# Patient Record
Sex: FEMALE | HISPANIC OR LATINO | Employment: FULL TIME | URBAN - METROPOLITAN AREA
[De-identification: names, ages, dates, MRNs, and addresses within clinical notes are randomized per-mention and may not be internally consistent; named-entity substitution may affect disease eponyms.]

---

## 2017-10-03 ENCOUNTER — OFFICE VISIT (OUTPATIENT)
Dept: FAMILY MEDICINE | Facility: CLINIC | Age: 37
End: 2017-10-03
Payer: COMMERCIAL

## 2017-10-03 VITALS
HEART RATE: 90 BPM | TEMPERATURE: 99 F | OXYGEN SATURATION: 100 % | BODY MASS INDEX: 24.38 KG/M2 | DIASTOLIC BLOOD PRESSURE: 70 MMHG | SYSTOLIC BLOOD PRESSURE: 106 MMHG | HEIGHT: 57 IN | WEIGHT: 113 LBS

## 2017-10-03 DIAGNOSIS — M46.46 LUMBAR DISCITIS: ICD-10-CM

## 2017-10-03 DIAGNOSIS — Z00.00 ROUTINE HISTORY AND PHYSICAL EXAMINATION OF ADULT: ICD-10-CM

## 2017-10-03 DIAGNOSIS — M54.50 CHRONIC BILATERAL LOW BACK PAIN WITHOUT SCIATICA: ICD-10-CM

## 2017-10-03 DIAGNOSIS — G89.29 CHRONIC BILATERAL LOW BACK PAIN WITHOUT SCIATICA: ICD-10-CM

## 2017-10-03 DIAGNOSIS — Z23 NEED FOR PROPHYLACTIC VACCINATION AND INOCULATION AGAINST INFLUENZA: ICD-10-CM

## 2017-10-03 DIAGNOSIS — J01.90 ACUTE SINUSITIS WITH SYMPTOMS > 10 DAYS: Primary | ICD-10-CM

## 2017-10-03 LAB
CHOLEST SERPL-MCNC: 178 MG/DL
GLUCOSE SERPL-MCNC: 73 MG/DL (ref 70–99)
HDLC SERPL-MCNC: 57 MG/DL
LDLC SERPL CALC-MCNC: 91 MG/DL
NONHDLC SERPL-MCNC: 121 MG/DL
TRIGL SERPL-MCNC: 148 MG/DL

## 2017-10-03 PROCEDURE — 90686 IIV4 VACC NO PRSV 0.5 ML IM: CPT | Performed by: PHYSICIAN ASSISTANT

## 2017-10-03 PROCEDURE — 99213 OFFICE O/P EST LOW 20 MIN: CPT | Mod: 25 | Performed by: PHYSICIAN ASSISTANT

## 2017-10-03 PROCEDURE — 36415 COLL VENOUS BLD VENIPUNCTURE: CPT | Performed by: PHYSICIAN ASSISTANT

## 2017-10-03 PROCEDURE — 90471 IMMUNIZATION ADMIN: CPT | Performed by: PHYSICIAN ASSISTANT

## 2017-10-03 PROCEDURE — 99395 PREV VISIT EST AGE 18-39: CPT | Mod: 25 | Performed by: PHYSICIAN ASSISTANT

## 2017-10-03 PROCEDURE — 82947 ASSAY GLUCOSE BLOOD QUANT: CPT | Performed by: PHYSICIAN ASSISTANT

## 2017-10-03 PROCEDURE — 80061 LIPID PANEL: CPT | Performed by: PHYSICIAN ASSISTANT

## 2017-10-03 RX ORDER — GUAIFENESIN AND DEXTROMETHORPHAN HYDROBROMIDE 1200; 60 MG/1; MG/1
1 TABLET, EXTENDED RELEASE ORAL 2 TIMES DAILY
Qty: 28 TABLET | Refills: 0 | Status: SHIPPED | OUTPATIENT
Start: 2017-10-03 | End: 2019-12-09

## 2017-10-03 RX ORDER — METHOCARBAMOL 750 MG/1
750 TABLET, FILM COATED ORAL 3 TIMES DAILY PRN
Qty: 30 TABLET | Refills: 1 | Status: SHIPPED | OUTPATIENT
Start: 2017-10-03 | End: 2019-12-09

## 2017-10-03 RX ORDER — NAPROXEN 500 MG/1
500 TABLET ORAL 2 TIMES DAILY WITH MEALS
Qty: 30 TABLET | Refills: 2 | Status: SHIPPED | OUTPATIENT
Start: 2017-10-03 | End: 2019-12-09

## 2017-10-03 ASSESSMENT — PAIN SCALES - GENERAL: PAINLEVEL: NO PAIN (0)
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
## 2017-10-03 NOTE — PATIENT INSTRUCTIONS
Augmentin 875 mg, 1 tablet twice a day for 10 days  Increase fluids  Nasal wash  Mucinex DM 1 tablet twice a day   Follow up if not better after the antibiotic course.     For back  Robaxin 750 mg three times a day as needed for back tightness  Naproxen 500 mg twice a day with food as needed for back pain  Apply warm pack  Start physical therapy    Preventive Health Recommendations  Female Ages 26 - 39  Yearly exam:   See your health care provider every year in order to    Review health changes.     Discuss preventive care.      Review your medicines if you your doctor has prescribed any.    Until age 30: Get a Pap test every three years (more often if you have had an abnormal result).    After age 30: Talk to your doctor about whether you should have a Pap test every 3 years or have a Pap test with HPV screening every 5 years.   You do not need a Pap test if your uterus was removed (hysterectomy) and you have not had cancer.  You should be tested each year for STDs (sexually transmitted diseases), if you're at risk.   Talk to your provider about how often to have your cholesterol checked.  If you are at risk for diabetes, you should have a diabetes test (fasting glucose).  Shots: Get a flu shot each year. Get a tetanus shot every 10 years.   Nutrition:     Eat at least 5 servings of fruits and vegetables each day.    Eat whole-grain bread, whole-wheat pasta and brown rice instead of white grains and rice.    Talk to your provider about Calcium and Vitamin D.     Lifestyle    Exercise at least 150 minutes a week (30 minutes a day, 5 days of the week). This will help you control your weight and prevent disease.    Limit alcohol to one drink per day.    No smoking.     Wear sunscreen to prevent skin cancer.    See your dentist every six months for an exam and cleaning.

## 2017-10-03 NOTE — MR AVS SNAPSHOT
After Visit Summary   10/3/2017    Stephanie Goodman    MRN: [id number]           Patient Information     Date Of Birth          1980        Visit Information        Provider Department      10/3/2017 9:00 AM Gale Jacob PA-C [facility]        Today's Diagnoses     Acute sinusitis with symptoms > 10 days    -  1    Need for prophylactic vaccination and inoculation against influenza        Routine history and physical examination of adult        Lumbar discitis        Chronic bilateral low back pain without sciatica          Care Instructions    Augmentin 875 mg, 1 tablet twice a day for 10 days  Increase fluids  Nasal wash  Mucinex DM 1 tablet twice a day   Follow up if not better after the antibiotic course.     For back  Robaxin 750 mg three times a day as needed for back tightness  Naproxen 500 mg twice a day with food as needed for back pain  Apply warm pack  Start physical therapy    Preventive Health Recommendations  Female Ages 26 - 39  Yearly exam:   See your health care provider every year in order to    Review health changes.     Discuss preventive care.      Review your medicines if you your doctor has prescribed any.    Until age 30: Get a Pap test every three years (more often if you have had an abnormal result).    After age 30: Talk to your doctor about whether you should have a Pap test every 3 years or have a Pap test with HPV screening every 5 years.   You do not need a Pap test if your uterus was removed (hysterectomy) and you have not had cancer.  You should be tested each year for STDs (sexually transmitted diseases), if you're at risk.   Talk to your provider about how often to have your cholesterol checked.  If you are at risk for diabetes, you should have a diabetes test (fasting glucose).  Shots: Get a flu shot each year. Get a tetanus shot every 10 years.   Nutrition:     Eat at least 5 servings of fruits and vegetables each  day.    Eat whole-grain bread, whole-wheat pasta and brown rice instead of white grains and rice.    Talk to your provider about Calcium and Vitamin D.     Lifestyle    Exercise at least 150 minutes a week (30 minutes a day, 5 days of the week). This will help you control your weight and prevent disease.    Limit alcohol to one drink per day.    No smoking.     Wear sunscreen to prevent skin cancer.    See your dentist every six months for an exam and cleaning.            Follow-ups after your visit        Additional Services     [facility] REFERRAL       **This order will print in the [facility] Scheduling Office**    Physical Therapy, Hand Therapy and Chiropractic Care are available through:    *[facility]  *[facility]  *[facility]    Call one number to schedule at any of the above locations: [phone].    Your provider has referred you to: Physical Therapy at [facility] or [facility]    Indication/Reason for Referral: lumbar disk disease  Onset of Illness: intermittent low back pain  Therapy Orders: improve flexibility and strength on lumbar back  Special Programs: massage  Special Request: Manual Therapy: Myofascial Release/Massage  None    Nelida    Nelida      Additional Comments for the Therapist or Chiropractor: massage    Please be aware that coverage of these services is subject to the terms and limitations of your health insurance plan.  Call member services at your health plan with any benefit or coverage questions.      Please bring the following to your appointment:    *Your personal calendar for scheduling future appointments  *Comfortable clothing                  Who to contact     If you have questions or need follow up information about today's clinic visit or your schedule please contact [facility] directly at [phone].  Normal or non-critical lab and imaging results will be communicated to you by [facility], letter  "or phone within 4 business days after the clinic has received the results. If you do not hear from us within 7 days, please contact the clinic through [employer] or phone. If you have a critical or abnormal lab result, we will notify you by phone as soon as possible.  Submit refill requests through [employer] or call your pharmacy and they will forward the refill request to us. Please allow 3 business days for your refill to be completed.          Additional Information About Your Visit        [employer] Information     [employer] lets you send messages to your doctor, view your test results, renew your prescriptions, schedule appointments and more. To sign up, go to [email or web address] . Click on \"Log in\" on the left side of the screen, which will take you to the Welcome page. Then click on \"Sign up Now\" on the right side of the page.     You will be asked to enter the access code listed below, as well as some personal information. Please follow the directions to create your username and password.     Your access code is: [email or web address]  Expires: 2018  9:40 AM     Your access code will  in 90 days. If you need help or a new code, please call your [facility] clinic or [phone].        Care EveryWhere ID     This is your Care EveryWhere ID. This could be used by other organizations to access your [facility] medical records  [id number]        Your Vitals Were     Pulse Temperature Height Pulse Oximetry Breastfeeding? BMI (Body Mass Index)    90 99  F (37.2  C) (Oral) 4' 8.5\" (1.435 m) 100% No 24.89 kg/m2       Blood Pressure from Last 3 Encounters:   10/03/17 106/70    Weight from Last 3 Encounters:   10/03/17 113 lb (51.3 kg)              We Performed the Following     Glucose     [facility] REFERRAL     Lipid Profile (Chol, Trig, HDL, LDL calc)          Today's Medication Changes          These changes are accurate as of: 10/3/17  9:40 AM.  If you have any questions, ask your nurse or doctor. "               Start taking these medicines.        Dose/Directions    amoxicillin-clavulanate 875-125 MG per tablet   Commonly known as:  AUGMENTIN   Used for:  Acute sinusitis with symptoms > 10 days   Started by:  Gale Jacob PA-C        Dose:  1 tablet   Take 1 tablet by mouth 2 times daily for 10 days   Quantity:  20 tablet   Refills:  0       Dextromethorphan-Guaifenesin  MG Tb12   Used for:  Acute sinusitis with symptoms > 10 days   Started by:  Gale Jacob PA-C        Dose:  1 tablet   Take 1 tablet by mouth 2 times daily   Quantity:  28 tablet   Refills:  0       methocarbamol 750 MG tablet   Commonly known as:  ROBAXIN   Used for:  Lumbar discitis   Started by:  Gale Jacob PA-C        Dose:  750 mg   Take 1 tablet (750 mg) by mouth 3 times daily as needed for muscle spasms   Quantity:  30 tablet   Refills:  1       naproxen 500 MG tablet   Commonly known as:  NAPROSYN   Used for:  Lumbar discitis   Started by:  Gale Jacob PA-C        Dose:  500 mg   Take 1 tablet (500 mg) by mouth 2 times daily (with meals)   Quantity:  30 tablet   Refills:  2            Where to get your medicines      These medications were sent to [facility]/pharmacy #[id number] - [facility], [address]  [address] [place] [address], [facility] [address]     Phone:  [phone]     amoxicillin-clavulanate 875-125 MG per tablet    Dextromethorphan-Guaifenesin  MG Tb12    methocarbamol 750 MG tablet    naproxen 500 MG tablet                Primary Care Provider    None Specified       No primary provider on file.        Equal Access to Services     [facility] Center[facility]UCINDA : Hadgill Machado, waalexda jigar, qaybta kaalzenobia jacobson, kaleigh gabriel. So [facility] [phone].    ATENCIÓN: Si habla español, tiene a oquendo disposición servicios gratuitos de asistencia lingüística. Llame al [phone].    We comply with  applicable federal civil rights laws and [place] laws. We do not discriminate on the basis of race, color, national origin, age, disability, sex, sexual orientation, or gender identity.            Thank you!     Thank you for choosing [facility]  for your care. Our goal is always to provide you with excellent care. Hearing back from our patients is one way we can continue to improve our services. Please take a few minutes to complete the written survey that you may receive in the mail after your visit with us. Thank you!             Your Updated Medication List - Protect others around you: Learn how to safely use, store and throw away your medicines at www.disposemymeds.org.          This list is accurate as of: 10/3/17  9:40 AM.  Always use your most recent med list.                   Brand Name Dispense Instructions for use Diagnosis    amoxicillin-clavulanate 875-125 MG per tablet    AUGMENTIN    20 tablet    Take 1 tablet by mouth 2 times daily for 10 days    Acute sinusitis with symptoms > 10 days       Dextromethorphan-Guaifenesin  MG Tb12     28 tablet    Take 1 tablet by mouth 2 times daily    Acute sinusitis with symptoms > 10 days       methocarbamol 750 MG tablet    ROBAXIN    30 tablet    Take 1 tablet (750 mg) by mouth 3 times daily as needed for muscle spasms    Lumbar discitis       naproxen 500 MG tablet    NAPROSYN    30 tablet    Take 1 tablet (500 mg) by mouth 2 times daily (with meals)    Lumbar discitis

## 2017-10-03 NOTE — Clinical Note
Please abstract the following data from this visit with this patient into the appropriate field in Epic:  Pap smear done on this date: 2 years ago (approximately), by this group: [facility] in [place], results were normal. .

## 2017-10-03 NOTE — PROGRESS NOTES
Injectable Influenza Immunization Documentation    1.  Is the person to be vaccinated sick today?   No    2. Does the person to be vaccinated have an allergy to a component   of the vaccine?   No    3. Has the person to be vaccinated ever had a serious reaction   to influenza vaccine in the past?   No    4. Has the person to be vaccinated ever had Guillain-Barré syndrome?   No    Form completed by Digna MEYERS

## 2017-10-03 NOTE — NURSING NOTE
"Chief Complaint   Patient presents with     Physical       Initial /70  Pulse 90  Temp 99  F (37.2  C) (Oral)  Ht 4' 8.5\" (1.435 m)  Wt 113 lb (51.3 kg)  SpO2 100%  Breastfeeding? No  BMI 24.89 kg/m2 Estimated body mass index is 24.89 kg/(m^2) as calculated from the following:    Height as of this encounter: 4' 8.5\" (1.435 m).    Weight as of this encounter: 113 lb (51.3 kg).  Medication Reconciliation: complete   Digna MEYERS        "

## 2017-10-03 NOTE — LETTER
October 9, 2017      Stephanie Goodman  [address]  [place] [address]            Dear Stephanie Goodman        Enclosed is a copy of the results.  Labs area within normal limits.     If you have any questions or concerns, please contact our team at [phone].      Sincerely,    Elena Jacob PAC/ak          Results for orders placed or performed in visit on 10/03/17   Lipid Profile (Chol, Trig, HDL, LDL calc)   Result Value Ref Range    Cholesterol 178 <200 mg/dL    Triglycerides 148 <150 mg/dL    HDL Cholesterol 57 >49 mg/dL    LDL Cholesterol Calculated 91 <100 mg/dL    Non HDL Cholesterol 121 <130 mg/dL   Glucose   Result Value Ref Range    Glucose 73 70 - 99 mg/dL

## 2017-10-09 ENCOUNTER — THERAPY VISIT (OUTPATIENT)
Dept: PHYSICAL THERAPY | Facility: CLINIC | Age: 37
End: 2017-10-09
Payer: COMMERCIAL

## 2017-10-09 DIAGNOSIS — M54.50 CHRONIC BILATERAL LOW BACK PAIN WITHOUT SCIATICA: ICD-10-CM

## 2017-10-09 DIAGNOSIS — G89.29 CHRONIC BILATERAL LOW BACK PAIN WITHOUT SCIATICA: ICD-10-CM

## 2017-10-09 DIAGNOSIS — M46.46 LUMBAR DISCITIS: Primary | ICD-10-CM

## 2017-10-09 PROCEDURE — 97110 THERAPEUTIC EXERCISES: CPT | Mod: GP | Performed by: PHYSICAL THERAPIST

## 2017-10-09 PROCEDURE — 97112 NEUROMUSCULAR REEDUCATION: CPT | Mod: GP | Performed by: PHYSICAL THERAPIST

## 2017-10-09 PROCEDURE — 97161 PT EVAL LOW COMPLEX 20 MIN: CPT | Mod: GP | Performed by: PHYSICAL THERAPIST

## 2017-10-09 NOTE — MR AVS SNAPSHOT
"              After Visit Summary   10/9/2017    Stephanie Goodman    MRN: [id number]           Patient Information     Date Of Birth          1980        Visit Information        Provider Department      10/9/2017 10:10 AM Christopher Oliver PT [facility]        Today's Diagnoses     Lumbar discitis    -  1    Chronic bilateral low back pain without sciatica           Follow-ups after your visit        Your next 10 appointments already scheduled     Oct 16, 2017 10:10 AM CDT   [facility] Sp[facility] with Christopher Oliver PT   [facility] ([facility]  )    [address]  [facility] [id number] [phone]              Who to contact     If you have questions or need follow up information about today's clinic visit or your schedule please contact [facility] directly at [phone].  Normal or non-critical lab and imaging results will be communicated to you by [email or web address], letter or phone within 4 business days after the clinic has received the results. If you do not hear from us within 7 days, please contact the clinic through [email or web address] or phone. If you have a critical or abnormal lab result, we will notify you by phone as soon as possible.  Submit refill requests through [employer] or call your pharmacy and they will forward the refill request to us. Please allow 3 business days for your refill to be completed.          Additional Information About Your Visit        [email or web address] Information     [employer] lets you send messages to your doctor, view your test results, renew your prescriptions, schedule appointments and more. To sign up, go to [email or web address] . Click on \"Log in\" on the left side of the screen, which will take you to the Welcome page. Then click on \"Sign up Now\" on the right side of the page.     You will be asked to enter the access code listed below, as well as some personal information. Please " follow the directions to create your username and password.     Your access code is: [email or web address]  Expires: 2018  9:40 AM     Your access code will  in 90 days. If you need help or a new code, please call your [place] clinic or [phone].        Care EveryWhere ID     This is your Care EveryWhere ID. This could be used by other organizations to access your [place] medical records  [id number]         Blood Pressure from Last 3 Encounters:   10/03/17 106/70    Weight from Last 3 Encounters:   10/03/17 51.3 kg (113 lb)              We Performed the Following     [facility] Inital Eval Report     Neuromuscular Re-Education     PT Eval, Low Complexity (30336)     Therapeutic Exercises        Primary Care Provider    None Specified       No primary provider on file.        Equal Access to Services     WALDO GILES : Hadgill lynch Sosergo, waaxda luqadaha, qaybta kaalmada adeegyada, kaleigh poole . So [facility] [phone].    ATENCIÓN: Si habla español, tiene a oquendo disposición servicios gratuitos de asistencia lingüística. Llame al [phone].    We comply with applicable federal civil rights laws and [place] laws. We do not discriminate on the basis of race, color, national origin, age, disability, sex, sexual orientation, or gender identity.            Thank you!     Thank you for choosing [facility]  for your care. Our goal is always to provide you with excellent care. Hearing back from our patients is one way we can continue to improve our services. Please take a few minutes to complete the written survey that you may receive in the mail after your visit with us. Thank you!             Your Updated Medication List - Protect others around you: Learn how to safely use, store and throw away your medicines at www.disposemymeds.org.          This list is accurate as of: 10/9/17 11:07 PM.  Always use your most recent med list.                    Brand Name Dispense Instructions for use Diagnosis    amoxicillin-clavulanate 875-125 MG per tablet    AUGMENTIN    20 tablet    Take 1 tablet by mouth 2 times daily for 10 days    Acute sinusitis with symptoms > 10 days       Dextromethorphan-Guaifenesin  MG Tb12     28 tablet    Take 1 tablet by mouth 2 times daily    Acute sinusitis with symptoms > 10 days       methocarbamol 750 MG tablet    ROBAXIN    30 tablet    Take 1 tablet (750 mg) by mouth 3 times daily as needed for muscle spasms    Lumbar discitis       naproxen 500 MG tablet    NAPROSYN    30 tablet    Take 1 tablet (500 mg) by mouth 2 times daily (with meals)    Lumbar discitis

## 2017-10-09 NOTE — PROGRESS NOTES
[facility] Initial Evaluation      Subjective:    Patient is a 37 year old female presenting with rehab back hpi.   Stephanie Goodman is a 37 year old female with a lumbar condition.  Occurance: MOPPING.  Condition occurred: at work.  This is a chronic and recurrent condition  10 YEAR HISTORY.   PA: 10/3/2017.    Patient reports pain:  Lumbar spine right, lumbar spine left and central lumbar spine.  Radiates to:  Gluteals right and gluteals left.  Pain is described as burning and aching and is intermittent and reported as 5/10.  Associated symptoms:  Loss of motion/stiffness and fatigue. Pain is the same all the time.  Symptoms are exacerbated by bending, lifting, sitting and standing (WORKING. RISING ROM A CHAIR.) and relieved by rest and NSAID's.  Since onset symptoms are unchanged.  Special tests:  MRI (IN THE PAST. ).  Previous treatment includes physical therapy (IN THE PAST. ).  There was significant improvement following previous treatment.  General health as reported by patient is good.  Past medical history: Dizziness, Concussions.  Medical allergies: no.  Other surgeries include:  Other.  Current medications:  Muscle relaxants and pain medication.  Current occupation is MAINT. .  Patient is working in normal job with restrictions.  Primary job tasks include:  Prolonged standing, lifting and repetitive tasks.    Barriers include:  None as reported by the patient.    Red flags:  None as reported by the patient.                        Objective:    System    Physical Exam      Tasha Lumbar Evaluation    Posture:  Sitting: fair        Correction of Posture: better  Other Observations: CENTRALIZES.   Movement Loss:  Flexion (Flex): pain and min  Extension (EXT): pain and mod  Side [place] R (SG R): pain  Side Glide L (SG L): pain  Test Movements:  FIS: During: increases      EIS: During: centralizing              Static Tests:          Lying Prone in Extension: CENTRALIZING.       Conclusion: derangement  Principle of Treatment:  Posture Correction: IN SITTING WITH TOWEL ROLL.     Extension: EIS, ELAINA.    Other: NEUTRAL SPINE, THER EX, THER ACT, NMR, MANUAL THERAPY.                                        ROS    Assessment/Plan:      Patient is a 37 year old female with lumbar complaints.    Patient has the following significant findings with corresponding treatment plan.                Diagnosis 1:  LUMBAR DISCITIS, CHRONIC BILATERAL LOW BACK PAIN WITHOUT SCIATICA.   Pain -  hot/cold therapy, US, electric stimulation, manual therapy, splint/taping/bracing/orthotics, self management, education, directional preference exercise and home program  Decreased ROM/flexibility - manual therapy, therapeutic exercise, therapeutic activity and home program  Decreased function - therapeutic activities and home program  Impaired posture - neuro re-education, therapeutic activities and home program    Therapy Evaluation Codes:   1) History comprised of:   Personal factors that impact the plan of care:      Past/current experiences.    Comorbidity factors that impact the plan of care are:      Concussion and Dizziness.     Medications impacting care: None.  2) Examination of Body Systems comprised of:   Body structures and functions that impact the plan of care:      Lumbar spine.   Activity limitations that impact the plan of care are:      Bending, Lifting, Sitting and Working.  3) Clinical presentation characteristics are:   Stable/Uncomplicated.  4) Decision-Making    Low complexity using standardized patient assessment instrument and/or measureable assessment of functional outcome.  Cumulative Therapy Evaluation is: Low complexity.    Previous and current functional limitations:  (See Goal Flow Sheet for this information)    Short term and Long term goals: (See Goal Flow Sheet for this information)     Communication ability:  Patient appears to be able to clearly communicate and understand verbal and  written communication and follow directions correctly.  Treatment Explanation - The following has been discussed with the patient:   RX ordered/plan of care  Anticipated outcomes  Possible risks and side effects  This patient would benefit from PT intervention to resume normal activities.   Rehab potential is good.    Frequency:  1 X week, once daily  Duration:  for 6 weeks  Discharge Plan:  Achieve all LTG.  Independent in home treatment program.  Reach maximal therapeutic benefit.    Please refer to the daily flowsheet for treatment today, total treatment time and time spent performing 1:1 timed codes.

## 2018-06-30 ENCOUNTER — OFFICE VISIT (OUTPATIENT)
Dept: URGENT CARE | Facility: URGENT CARE | Age: 38
End: 2018-06-30
Payer: COMMERCIAL

## 2018-06-30 VITALS
RESPIRATION RATE: 20 BRPM | BODY MASS INDEX: 25.11 KG/M2 | DIASTOLIC BLOOD PRESSURE: 64 MMHG | TEMPERATURE: 98 F | SYSTOLIC BLOOD PRESSURE: 132 MMHG | WEIGHT: 114 LBS | OXYGEN SATURATION: 100 % | HEART RATE: 66 BPM

## 2018-06-30 DIAGNOSIS — R39.15 URGENCY OF URINATION: ICD-10-CM

## 2018-06-30 DIAGNOSIS — R10.9 FLANK PAIN: Primary | ICD-10-CM

## 2018-06-30 DIAGNOSIS — N20.0 CALCULUS OF KIDNEY: ICD-10-CM

## 2018-06-30 DIAGNOSIS — R11.0 NAUSEA: ICD-10-CM

## 2018-06-30 LAB
ALBUMIN UR-MCNC: ABNORMAL MG/DL
APPEARANCE UR: CLEAR
BACTERIA #/AREA URNS HPF: ABNORMAL /HPF
BETA HCG QUAL IFA URINE: NEGATIVE
BILIRUB UR QL STRIP: NEGATIVE
COLOR UR AUTO: YELLOW
GLUCOSE UR STRIP-MCNC: NEGATIVE MG/DL
HGB UR QL STRIP: ABNORMAL
KETONES UR STRIP-MCNC: NEGATIVE MG/DL
LEUKOCYTE ESTERASE UR QL STRIP: NEGATIVE
MUCOUS THREADS #/AREA URNS LPF: PRESENT /LPF
NITRATE UR QL: NEGATIVE
NON-SQ EPI CELLS #/AREA URNS LPF: ABNORMAL /LPF
PH UR STRIP: 6 PH (ref 5–7)
RBC #/AREA URNS AUTO: ABNORMAL /HPF
SOURCE: ABNORMAL
SP GR UR STRIP: 1.02 (ref 1–1.03)
UROBILINOGEN UR STRIP-ACNC: 0.2 EU/DL (ref 0.2–1)
WBC #/AREA URNS AUTO: ABNORMAL /HPF

## 2018-06-30 PROCEDURE — 81001 URINALYSIS AUTO W/SCOPE: CPT | Performed by: PHYSICIAN ASSISTANT

## 2018-06-30 PROCEDURE — 84703 CHORIONIC GONADOTROPIN ASSAY: CPT | Performed by: PHYSICIAN ASSISTANT

## 2018-06-30 PROCEDURE — 96372 THER/PROPH/DIAG INJ SC/IM: CPT | Performed by: PHYSICIAN ASSISTANT

## 2018-06-30 PROCEDURE — 99214 OFFICE O/P EST MOD 30 MIN: CPT | Mod: 25 | Performed by: PHYSICIAN ASSISTANT

## 2018-06-30 RX ORDER — KETOROLAC TROMETHAMINE 30 MG/ML
60 INJECTION, SOLUTION INTRAMUSCULAR; INTRAVENOUS ONCE
Qty: 2 ML | Refills: 0
Start: 2018-06-30 | End: 2018-06-30

## 2018-06-30 RX ORDER — ONDANSETRON 4 MG/1
4 TABLET, ORALLY DISINTEGRATING ORAL ONCE
Qty: 1 TABLET | Refills: 0
Start: 2018-06-30 | End: 2018-06-30

## 2018-06-30 ASSESSMENT — ENCOUNTER SYMPTOMS
HEMATURIA: 0
NECK PAIN: 0
CHILLS: 0
SORE THROAT: 0
DYSURIA: 1
PALPITATIONS: 0
MYALGIAS: 0
LIGHT-HEADEDNESS: 0
COUGH: 0
DIZZINESS: 0
ABDOMINAL PAIN: 1
FREQUENCY: 1
NAUSEA: 0
RHINORRHEA: 0
NECK STIFFNESS: 0
FATIGUE: 0
FLANK PAIN: 0
VOMITING: 0
WEAKNESS: 0
NEUROLOGICAL NEGATIVE: 1
CARDIOVASCULAR NEGATIVE: 1
POLYDIPSIA: 0
RESPIRATORY NEGATIVE: 1
FEVER: 0
ACTIVITY CHANGE: 0
ENDOCRINE NEGATIVE: 1
CONSTITUTIONAL NEGATIVE: 1
ADENOPATHY: 0
SHORTNESS OF BREATH: 0
DIARRHEA: 0
HEADACHES: 0
MUSCULOSKELETAL NEGATIVE: 1

## 2018-06-30 ASSESSMENT — PAIN SCALES - GENERAL: PAINLEVEL: EXTREME PAIN (8)

## 2018-06-30 NOTE — PROGRESS NOTES
Chief Complaint:    Chief Complaint   Patient presents with     Abdominal Pain     started at 5 am       HPI: Stephanie Goodman is an 38 year old female who presents for evaluation and treatment of abdominal pain.  Patient states that this started this morning at roughly 5 A.M.  She noticed some urinary frequency and urgency when the pain started.  The pain has worsened and is in the L flank area.  She denies any diarrhea.  No nausea or vomiting.  She has been having normal bowel movements.  Last BM was this morning      ROS:      Review of Systems   Constitutional: Negative.  Negative for activity change, chills, fatigue and fever.   HENT: Negative for congestion, ear pain, rhinorrhea and sore throat.    Respiratory: Negative.  Negative for cough and shortness of breath.    Cardiovascular: Negative.  Negative for chest pain and palpitations.   Gastrointestinal: Positive for abdominal pain. Negative for diarrhea, nausea and vomiting.   Endocrine: Negative.  Negative for polydipsia and polyuria.   Genitourinary: Positive for dysuria, frequency and urgency. Negative for flank pain, hematuria, pelvic pain, vaginal discharge and vaginal pain.   Musculoskeletal: Negative.  Negative for myalgias, neck pain and neck stiffness.   Allergic/Immunologic: Negative for immunocompromised state.   Neurological: Negative.  Negative for dizziness, weakness, light-headedness and headaches.   Hematological: Negative for adenopathy.        No pertinent family Hx at this time.  No Pertinent surgical Hx at this time.  Patient does have a Hx of chronic low back pain.    Family History   No family history on file.    Social History  Social History     Social History     Marital status:      Spouse name: N/A     Number of children: N/A     Years of education: N/A     Occupational History     Not on file.     Social History Main Topics     Smoking status: Never Smoker     Smokeless tobacco: Never Used     Alcohol use Yes     Drug use:  No     Sexual activity: Yes     Partners: Male     Other Topics Concern     Not on file     Social History Narrative        Surgical History:  No past surgical history on file.     Problem List:  Patient Active Problem List   Diagnosis     Lumbar discitis     Chronic bilateral low back pain without sciatica        Allergies:  Allergies   Allergen Reactions     No Clinical Screening - See Comments         Current Meds:    Current Outpatient Prescriptions:      Dextromethorphan-Guaifenesin  MG TB12, Take 1 tablet by mouth 2 times daily (Patient not taking: Reported on 6/30/2018), Disp: 28 tablet, Rfl: 0     ketorolac (TORADOL) 60 MG/2ML SOLN injection, Inject 2 mLs (60 mg) into the muscle once for 1 dose, Disp: 2 mL, Rfl: 0     methocarbamol (ROBAXIN) 750 MG tablet, Take 1 tablet (750 mg) by mouth 3 times daily as needed for muscle spasms (Patient not taking: Reported on 6/30/2018), Disp: 30 tablet, Rfl: 1     naproxen (NAPROSYN) 500 MG tablet, Take 1 tablet (500 mg) by mouth 2 times daily (with meals) (Patient not taking: Reported on 6/30/2018), Disp: 30 tablet, Rfl: 2     ondansetron (ZOFRAN ODT) 4 MG ODT tab, Take 1 tablet (4 mg) by mouth once for 1 dose, Disp: 1 tablet, Rfl: 0     PHYSICAL EXAM:     Vital signs noted and reviewed by Carlos Brand  /64 (BP Location: Left arm, Patient Position: Chair, Cuff Size: Adult Regular)  Pulse 66  Temp 98  F (36.7  C) (Oral)  Resp 20  Wt 114 lb (51.7 kg)  SpO2 100%  BMI 25.11 kg/m2     PEFR:  General appearance: healthy, alert and severe distress  Ears: R TM - normal: no effusions, no erythema, and normal landmarks, L TM - normal: no effusions, no erythema, and normal landmarks  Eyes: R normal, L normal  Nose: normal  Oropharynx: normal  Neck: supple and no adenopathy  Lungs: normal and clear to auscultation. L CVA tenderness with palpation  Heart: S1, S2 normal, no murmur, click, rub or gallop, regular rate and rhythm  Abdomen: Abdomen soft, non-tender  without masses or organomegaly.  Extremities: Extremities normal. No deformities, edema, or skin discoloration.  Neuro: Gait normal. Reflexes normal and symmetric. Sensation grossly intact.     Labs:     Results for orders placed or performed in visit on 06/30/18   *UA reflex to Microscopic and Culture ([place] and [facility] (except [place] and [place])   Result Value Ref Range    Color Urine Yellow     Appearance Urine Clear     Glucose Urine Negative NEG^Negative mg/dL    Bilirubin Urine Negative NEG^Negative    Ketones Urine Negative NEG^Negative mg/dL    Specific Gravity Urine 1.025 1.003 - 1.035    Blood Urine Large (A) NEG^Negative    pH Urine 6.0 5.0 - 7.0 pH    Protein Albumin Urine Trace (A) NEG^Negative mg/dL    Urobilinogen Urine 0.2 0.2 - 1.0 EU/dL    Nitrite Urine Negative NEG^Negative    Leukocyte Esterase Urine Negative NEG^Negative    Source Midstream Urine    Beta HCG Qual, Urine - [facility] and [place] ([id number])   Result Value Ref Range    Beta HCG Qual IFA Urine Negative NEG^Negative      Urine Microscopic   Result Value Ref Range    WBC Urine 0 - 5 OTO5^0 - 5 /HPF    RBC Urine  (A) OTO2^O - 2 /HPF    Squamous Epithelial /LPF Urine Many (A) FEW^Few /LPF    Bacteria Urine Few (A) NEG^Negative /HPF    Mucous Urine Present (A) NEG^Negative /LPF         Medical Decision Making:    Differential Diagnosis:  Abdominal Pain: Diverticular Disease, UTI, Pyelonephritis, Ovarian Cyst and Kidney Stone  UTI: Dysuria, STD      ASSESSMENT:     1. Flank pain    2. Calculus of kidney    3. Urgency of urination    4. Nausea         PLAN:     Patient presents with worsening L flank pain with urinary frequency and urgency.    Urine HCG was negative.  Discussed urine results with patient.  There is some blood in the urine,   Highly suspect stone at this time.  Patient was given 60 Mg Toradol IM in clinic today and observed for 15 minutes.  She verbalized significant relief of symptoms.  She was given 4 Mg  Zofran ODT in clinic for nausea.  Patient does not have a Hx of stones.  Patient advised that I would like her evaluated further in the ED now.   Patient did drive herself here.  She was encouraged to call her  to come give her a ride as it is not safe for her to drive herself.  Patient refused EMS transport.  Patient's  will drive her.  She was discharged to his care in good condition at 11:19  Patient given copy of labs and AVS to take with today.  Patient verbalized understanding and agreed with this plan.     Carlos Brand  6/30/2018, 9:55 AM

## 2018-06-30 NOTE — MR AVS SNAPSHOT
After Visit Summary   6/30/2018    Stephanie Goodman    MRN: [id number]           Patient Information     Date Of Birth          1980        Visit Information        Provider Department      6/30/2018 9:50 AM Carlos Brand PA-C [facility]        Today's Diagnoses     Flank pain    -  1    Calculus of kidney        Urgency of urination        Nausea          Care Instructions      Flank Pain, Uncertain Cause  The flank is the area between your upper abdomen and your back. Pain there is often caused by a problem with your kidneys. It might be a kidney infection or a kidney stone. Other causes of flank pain include spinal arthritis, a pinched nerve from a back injury, or a back muscle strain or spasm.  The cause of your flank pain is not certain. You may need other tests.  Home care  Follow these tips when caring for yourself at home:    You may use acetaminophen or ibuprofen to control pain, unless your health care provider prescribed another medicine. If you have chronic liver or kidney disease, talk with your provider before taking these medicines. Also talk with your provider first if you ve ever had a stomach ulcer or GI bleeding.    If the pain is coming from your muscles, you may get relief with ice or heat. During the first 2 days after the injury, put an ice pack on the painful area for 20 minutes every 2 to 4 hours. This will reduce swelling and pain. A hot shower, hot bath, or heating pad works well for a muscle spasm. You can start with ice, then switch to heat after 2 days. You might find that alternating ice and heat works well. Use the method that feels the best to you.  Follow-up care  Follow up with your healthcare provider if your symptoms don t get better over the next few days.  When to seek medical advice  Call your healthcare provider right away if any of these happen:    Repeated vomiting    Fever of 100.4 F (38 C) or higher, or as directed by your  "health care provider    Flank pain that gets worse    Pain that spreads to the front of your belly (abdomen)    Dizziness, weakness, or fainting    Blood in your urine    Burning feeling when you urinate or the need to urinate often    Pain in one of your legs that gets worse    Numbness or weakness in a leg  Date Last Reviewed: 10/1/2016    0726-4819 [employer]. [address] [place] [address], [place] [address]. All rights reserved. This information is not intended as a substitute for professional medical care. Always follow your healthcare professional's instructions.         * KIDNEY STONE (w/ Colic)    The sharp cramping pain and nausea/vomiting that you have is due to a small stone which has formed in the kidney and is now passing down a narrow tube (ureter) on its way to your bladder. Once it reaches your bladder, the pain will stop. The stone may pass in your urine stream in one piece. [The size may be 1/16\" to 1/4\" (1-6mm)]. Or, the stone may also break up into danny fragments which you may not even notice.  Once you have had a kidney stone there is a risk for recurrence in the future.  HOME CARE:      Drink lots of fluid (at least 8-10 glasses of water a day).    Most stones will pass on their own, but may take from a few hours to a few days.    Each time you urinate, do so in a jar. Pour the urine from the jar through the strainer and into the toilet. Continue doing this until 24 hours after your pain stops. By then, if there was a kidney stone, it should pass from your bladder. Some stones dissolve into sand-like particles and pass right through the strainer. In that case, you won't ever see a stone.    Save any stone that you find in the strainer and bring it to your doctor for analysis. It may be possible to prevent certain types of stones from forming. Therefore, it is important to know what kind of stone you have.    Try to stay as active as possible since this will help the stone pass. Do not " stay in bed unless your pain prevents you from getting up. You may notice a red, pink or brown color to your urine. This is normal while passing a kidney stone.  FOLLOW UP with your doctor or return to this facility if the pain lasts more than 48 hours.  GET PROMPT MEDICAL ATTENTION if any of the following occur:    Pain that is not controlled by the medicine given    Repeated vomiting or unable to keep down fluids    Weakness, dizziness or fainting    Fever over 101  F (38.3  C)    Passage of solid red or brown urine (can't see through it) or urine with lots of blood clots    Unable to pass urine for 8 hours and increasing bladder pressure    5162-2074 [employer]. [address] [place] [address]. All rights reserved. This information is not intended as a substitute for professional medical care. Always follow your healthcare professional's instructions.  This information has been modified by your health care provider with permission from the publisher.            Follow-ups after your visit        Who to contact     If you have questions or need follow up information about today's clinic visit or your schedule please contact [facility] directly at [phone].  Normal or non-critical lab and imaging results will be communicated to you by Jetaporthart, letter or phone within 4 business days after the clinic has received the results. If you do not hear from us within 7 days, please contact the clinic through Symtavisiont or phone. If you have a critical or abnormal lab result, we will notify you by phone as soon as possible.  Submit refill requests through [employer] or call your pharmacy and they will forward the refill request to us. Please allow 3 business days for your refill to be completed.          Additional Information About Your Visit        JetaportharYoopies Information     [employer]start lets you send messages to your doctor, view your test results, renew your prescriptions, schedule  "appointments and more. To sign up, go to [email or web address] . Click on \"Log in\" on the left side of the screen, which will take you to the Welcome page. Then click on \"Sign up Now\" on the right side of the page.     You will be asked to enter the access code listed below, as well as some personal information. Please follow the directions to create your username and password.     Your access code is: [email or web address]  Expires: 2018 11:15 AM     Your access code will  in 90 days. If you need help or a new code, please call your [facility] clinic or [phone].        Care EveryWhere ID     This is your Care EveryWhere ID. This could be used by other organizations to access your [facility] medical records  [id number]        Your Vitals Were     Pulse Temperature Respirations Pulse Oximetry BMI (Body Mass Index)       66 98  F (36.7  C) (Oral) 20 100% 25.11 kg/m2        Blood Pressure from Last 3 Encounters:   18 132/64   10/03/17 106/70    Weight from Last 3 Encounters:   18 114 lb (51.7 kg)   10/03/17 113 lb (51.3 kg)              We Performed the Following     *UA reflex to Microscopic and Culture ([facility] (except [place] and [place])     Beta HCG Qual, Urine - [facility] and [place] ([id number])     Urine Microscopic          Today's Medication Changes          These changes are accurate as of 18 11:15 AM.  If you have any questions, ask your nurse or doctor.               Start taking these medicines.        Dose/Directions    ketorolac 60 MG/2ML Soln injection   Commonly known as:  TORADOL   Used for:  Calculus of kidney, Flank pain   Started by:  Carlos Brand PA-C        Dose:  60 mg   Inject 2 mLs (60 mg) into the muscle once for 1 dose   Quantity:  2 mL   Refills:  0       ondansetron 4 MG ODT tab   Commonly known as:  ZOFRAN ODT   Used for:  Nausea   Started by:  Carlos Brand PA-C        Dose:  4 mg   Take 1 tablet (4 mg) by mouth once for 1 dose "   Quantity:  1 tablet   Refills:  0            Where to get your medicines      Some of these will need a paper prescription and others can be bought over the counter.  Ask your nurse if you have questions.     You don't need a prescription for these medications     ketorolac 60 MG/2ML Soln injection    ondansetron 4 MG ODT tab                Primary Care Provider Fax #    Physician No Ref-Primary [phone]       No address on file        Equal Access to Services     WALDO GILES : Hadii aad ku hadasho Soomaali, waaxda luqadaha, qaybta kaalmada adeegyada, waxay idiin haysmithn adeedy khericash lakoreyross ah. So [facility] [phone].    ATENCIÓN: Si habla malvin, tiene a oquendo disposición servicios gratuitos de asistencia lingüística. Llame al [phone].    We comply with applicable federal civil rights laws and [place] laws. We do not discriminate on the basis of race, color, national origin, age, disability, sex, sexual orientation, or gender identity.            Thank you!     Thank you for choosing [facility]  for your care. Our goal is always to provide you with excellent care. Hearing back from our patients is one way we can continue to improve our services. Please take a few minutes to complete the written survey that you may receive in the mail after your visit with us. Thank you!             Your Updated Medication List - Protect others around you: Learn how to safely use, store and throw away your medicines at www.disposemymeds.org.          This list is accurate as of 6/30/18 11:15 AM.  Always use your most recent med list.                   Brand Name Dispense Instructions for use Diagnosis    Dextromethorphan-Guaifenesin  MG Tb12     28 tablet    Take 1 tablet by mouth 2 times daily    Acute sinusitis with symptoms > 10 days       ketorolac 60 MG/2ML Soln injection    TORADOL    2 mL    Inject 2 mLs (60 mg) into the muscle once for 1 dose    Calculus of kidney, Flank pain        methocarbamol 750 MG tablet    ROBAXIN    30 tablet    Take 1 tablet (750 mg) by mouth 3 times daily as needed for muscle spasms    Lumbar discitis       naproxen 500 MG tablet    NAPROSYN    30 tablet    Take 1 tablet (500 mg) by mouth 2 times daily (with meals)    Lumbar discitis       ondansetron 4 MG ODT tab    ZOFRAN ODT    1 tablet    Take 1 tablet (4 mg) by mouth once for 1 dose    Nausea

## 2018-06-30 NOTE — PATIENT INSTRUCTIONS
Flank Pain, Uncertain Cause  The flank is the area between your upper abdomen and your back. Pain there is often caused by a problem with your kidneys. It might be a kidney infection or a kidney stone. Other causes of flank pain include spinal arthritis, a pinched nerve from a back injury, or a back muscle strain or spasm.  The cause of your flank pain is not certain. You may need other tests.  Home care  Follow these tips when caring for yourself at home:    You may use acetaminophen or ibuprofen to control pain, unless your health care provider prescribed another medicine. If you have chronic liver or kidney disease, talk with your provider before taking these medicines. Also talk with your provider first if you ve ever had a stomach ulcer or GI bleeding.    If the pain is coming from your muscles, you may get relief with ice or heat. During the first 2 days after the injury, put an ice pack on the painful area for 20 minutes every 2 to 4 hours. This will reduce swelling and pain. A hot shower, hot bath, or heating pad works well for a muscle spasm. You can start with ice, then switch to heat after 2 days. You might find that alternating ice and heat works well. Use the method that feels the best to you.  Follow-up care  Follow up with your healthcare provider if your symptoms don t get better over the next few days.  When to seek medical advice  Call your healthcare provider right away if any of these happen:    Repeated vomiting    Fever of 100.4 F (38 C) or higher, or as directed by your health care provider    Flank pain that gets worse    Pain that spreads to the front of your belly (abdomen)    Dizziness, weakness, or fainting    Blood in your urine    Burning feeling when you urinate or the need to urinate often    Pain in one of your legs that gets worse    Numbness or weakness in a leg  Date Last Reviewed: 10/1/2016    8110-2603 [employer]. [address] [facility], [place] [address]. All  "rights reserved. This information is not intended as a substitute for professional medical care. Always follow your healthcare professional's instructions.         * KIDNEY STONE (w/ Colic)    The sharp cramping pain and nausea/vomiting that you have is due to a small stone which has formed in the kidney and is now passing down a narrow tube (ureter) on its way to your bladder. Once it reaches your bladder, the pain will stop. The stone may pass in your urine stream in one piece. [The size may be 1/16\" to 1/4\" (1-6mm)]. Or, the stone may also break up into danny fragments which you may not even notice.  Once you have had a kidney stone there is a risk for recurrence in the future.  HOME CARE:      Drink lots of fluid (at least 8-10 glasses of water a day).    Most stones will pass on their own, but may take from a few hours to a few days.    Each time you urinate, do so in a jar. Pour the urine from the jar through the strainer and into the toilet. Continue doing this until 24 hours after your pain stops. By then, if there was a kidney stone, it should pass from your bladder. Some stones dissolve into sand-like particles and pass right through the strainer. In that case, you won't ever see a stone.    Save any stone that you find in the strainer and bring it to your doctor for analysis. It may be possible to prevent certain types of stones from forming. Therefore, it is important to know what kind of stone you have.    Try to stay as active as possible since this will help the stone pass. Do not stay in bed unless your pain prevents you from getting up. You may notice a red, pink or brown color to your urine. This is normal while passing a kidney stone.  FOLLOW UP with your doctor or return to this facility if the pain lasts more than 48 hours.  GET PROMPT MEDICAL ATTENTION if any of the following occur:    Pain that is not controlled by the medicine given    Repeated vomiting or unable to keep down " fluids    Weakness, dizziness or fainting    Fever over 101  F (38.3  C)    Passage of solid red or brown urine (can't see through it) or urine with lots of blood clots    Unable to pass urine for 8 hours and increasing bladder pressure    8524-1339 [employer]. [address] [place] [address]. All rights reserved. This information is not intended as a substitute for professional medical care. Always follow your healthcare professional's instructions.  This information has been modified by your health care provider with permission from the publisher.

## 2018-06-30 NOTE — NURSING NOTE
The following medication was given:     MEDICATION: Toradol 60 mg  ROUTE: IM  SITE: Ventrogluteal - Left  DOSE: 60 mg   LOT #: [id number]  :  [employer]  EXPIRATION DATE:  11/2019  NDC#: 59447-017-28      The following medication was given:     MEDICATION: Ondansetron Orally Disintegrating Tablets 4mg  ROUTE: oral  SITE: mouth  DOSE: 4 mg   LOT #: [id number]  :  [employer]  EXPIRATION DATE:  01/2021  NDC#: 65862-390-10         Soniya Silver

## 2018-07-01 PROCEDURE — 82365 CALCULUS SPECTROSCOPY: CPT | Mod: 90 | Performed by: PHYSICIAN ASSISTANT

## 2018-07-01 PROCEDURE — 99000 SPECIMEN HANDLING OFFICE-LAB: CPT | Performed by: PHYSICIAN ASSISTANT

## 2018-07-02 DIAGNOSIS — N20.0 CALCULUS OF KIDNEY: ICD-10-CM

## 2018-07-07 LAB
APPEARANCE STONE: NORMAL
COMPN STONE: NORMAL
NUMBER STONE: 2
SIZE STONE: NORMAL MM
WT STONE: 24 MG

## 2018-12-03 ENCOUNTER — OFFICE VISIT (OUTPATIENT)
Dept: FAMILY MEDICINE | Facility: CLINIC | Age: 38
End: 2018-12-03
Payer: COMMERCIAL

## 2018-12-03 VITALS
HEIGHT: 57 IN | TEMPERATURE: 98.4 F | DIASTOLIC BLOOD PRESSURE: 75 MMHG | HEART RATE: 58 BPM | OXYGEN SATURATION: 99 % | WEIGHT: 123.6 LBS | RESPIRATION RATE: 16 BRPM | BODY MASS INDEX: 26.66 KG/M2 | SYSTOLIC BLOOD PRESSURE: 121 MMHG

## 2018-12-03 DIAGNOSIS — Z13.1 SCREENING FOR DIABETES MELLITUS: ICD-10-CM

## 2018-12-03 DIAGNOSIS — M77.8 TENDONITIS OF WRIST, LEFT: ICD-10-CM

## 2018-12-03 DIAGNOSIS — Z12.4 SCREENING FOR MALIGNANT NEOPLASM OF CERVIX: ICD-10-CM

## 2018-12-03 DIAGNOSIS — R68.89 COLD INTOLERANCE: ICD-10-CM

## 2018-12-03 DIAGNOSIS — Z00.00 ROUTINE HISTORY AND PHYSICAL EXAMINATION OF ADULT: Primary | ICD-10-CM

## 2018-12-03 DIAGNOSIS — Z13.29 SCREENING FOR THYROID DISORDER: ICD-10-CM

## 2018-12-03 DIAGNOSIS — Z13.220 SCREENING FOR LIPID DISORDERS: ICD-10-CM

## 2018-12-03 DIAGNOSIS — Z23 NEED FOR PROPHYLACTIC VACCINATION AND INOCULATION AGAINST INFLUENZA: ICD-10-CM

## 2018-12-03 PROCEDURE — G0145 SCR C/V CYTO,THINLAYER,RESCR: HCPCS | Performed by: PHYSICIAN ASSISTANT

## 2018-12-03 PROCEDURE — 99214 OFFICE O/P EST MOD 30 MIN: CPT | Mod: 25 | Performed by: PHYSICIAN ASSISTANT

## 2018-12-03 PROCEDURE — 90686 IIV4 VACC NO PRSV 0.5 ML IM: CPT | Performed by: PHYSICIAN ASSISTANT

## 2018-12-03 PROCEDURE — 99395 PREV VISIT EST AGE 18-39: CPT | Mod: 25 | Performed by: PHYSICIAN ASSISTANT

## 2018-12-03 PROCEDURE — G0476 HPV COMBO ASSAY CA SCREEN: HCPCS | Performed by: PHYSICIAN ASSISTANT

## 2018-12-03 PROCEDURE — 90471 IMMUNIZATION ADMIN: CPT | Performed by: PHYSICIAN ASSISTANT

## 2018-12-03 RX ORDER — DICLOFENAC SODIUM 75 MG/1
75 TABLET, DELAYED RELEASE ORAL 2 TIMES DAILY PRN
Qty: 30 TABLET | Refills: 1 | Status: SHIPPED | OUTPATIENT
Start: 2018-12-03 | End: 2019-12-09

## 2018-12-03 ASSESSMENT — ANXIETY QUESTIONNAIRES
1. FEELING NERVOUS, ANXIOUS, OR ON EDGE: NOT AT ALL
3. WORRYING TOO MUCH ABOUT DIFFERENT THINGS: NOT AT ALL
2. NOT BEING ABLE TO STOP OR CONTROL WORRYING: NOT AT ALL
5. BEING SO RESTLESS THAT IT IS HARD TO SIT STILL: NOT AT ALL
7. FEELING AFRAID AS IF SOMETHING AWFUL MIGHT HAPPEN: NOT AT ALL
6. BECOMING EASILY ANNOYED OR IRRITABLE: SEVERAL DAYS
GAD7 TOTAL SCORE: 1

## 2018-12-03 ASSESSMENT — PAIN SCALES - GENERAL: PAINLEVEL: NO PAIN (0)

## 2018-12-03 ASSESSMENT — PATIENT HEALTH QUESTIONNAIRE - PHQ9
5. POOR APPETITE OR OVEREATING: NOT AT ALL
SUM OF ALL RESPONSES TO PHQ QUESTIONS 1-9: 0

## 2018-12-03 NOTE — LETTER
December 11, 2018    Stephanie Goodman  [address] [place] [address]    Dear Stephanie,  We are happy to inform you that your PAP smear result from 12/3/18 is normal.  We are now able to do a follow up test on PAP smears. The DNA test is for HPV (Human Papilloma Virus). Cervical cancer is closely linked with certain types of HPV. Your results showed no evidence of high risk HPV.  Therefore we recommend you return in 5 years for your next pap smear and HPV test.  You will still need to return to the clinic every year for an annual exam and other preventive tests.  If you have additional questions regarding this result, please call our registered nurse, Jerrica at [phone].  Sincerely,    Gale Jacob PA-C/valery

## 2018-12-03 NOTE — MR AVS SNAPSHOT
After Visit Summary   12/3/2018    Stephanie Goodman    MRN: [id number]           Patient Information     Date Of Birth          1980        Visit Information        Provider Department      12/3/2018 8:40 AM Gale Jacob PA-C [facility]        Today's Diagnoses     Routine history and physical examination of adult    -  1    Tendonitis of wrist, left        Screening for malignant neoplasm of cervix        Need for prophylactic vaccination and inoculation against influenza          Care Instructions    Diclofenac 1 tablet twice a day  with food for 1 week, then use as needed for pain  Wear brace daily for 6 weeks     Drink 6 glasses of water 8 oz, daily to prevent cramping  Take multivitamin with magnesium, or magnesium supplement     Preventive Health Recommendations  Female Ages 26 - 39  Yearly exam:   See your health care provider every year in order to    Review health changes.     Discuss preventive care.      Review your medicines if you your doctor has prescribed any.    Until age 30: Get a Pap test every three years (more often if you have had an abnormal result).    After age 30: Talk to your doctor about whether you should have a Pap test every 3 years or have a Pap test with HPV screening every 5 years.   You do not need a Pap test if your uterus was removed (hysterectomy) and you have not had cancer.  You should be tested each year for STDs (sexually transmitted diseases), if you're at risk.   Talk to your provider about how often to have your cholesterol checked.  If you are at risk for diabetes, you should have a diabetes test (fasting glucose).  Shots: Get a flu shot each year. Get a tetanus shot every 10 years.   Nutrition:     Eat at least 5 servings of fruits and vegetables each day.    Eat whole-grain bread, whole-wheat pasta and brown rice instead of white grains and rice.    Get adequate Calcium and Vitamin D.     Lifestyle    Exercise at  least 150 minutes a week (30 minutes a day, 5 days of the week). This will help you control your weight and prevent disease.    Limit alcohol to one drink per day.    No smoking.     Wear sunscreen to prevent skin cancer.    See your dentist every six months for an exam and cleaning.    Wrist Pronation (Strength)    These instructions are for your right elbow. Switch sides for your left elbow.   1. Sit in a chair next to a table. Rest your right forearm on the table. Hang your right wrist off the edge of the table, palm up. Hold a hand weight in your right hand. Your healthcare provider will tell you what size of hand weight to use.  2. Keep your forearm in place and turn your wrist to the left until your thumb is on top.  3. Slowly lower the hand weight back down to the right.  4. Repeat 10 times, or as instructed.  Date Last Reviewed: 3/10/2016    6374-8400 [employer]. [address] [place] [address]. All rights reserved. This information is not intended as a substitute for professional medical care. Always follow your healthcare professional's instructions.        Wrist Pronation (Strength)    These instructions are for your right elbow. Switch sides for your left elbow.   5. Sit in a chair next to a table. Rest your right forearm on the table. Hang your right wrist off the edge of the table, palm up. Hold a hand weight in your right hand. Your healthcare provider will tell you what size of hand weight to use.  6. Keep your forearm in place and turn your wrist to the left until your thumb is on top.  7. Slowly lower the hand weight back down to the right.  8. Repeat 10 times, or as instructed.  Date Last Reviewed: 3/10/2016    2527-5394 [employer]. [address] [place] [address]. All rights reserved. This information is not intended as a substitute for professional medical care. Always follow your healthcare professional's instructions.                Follow-ups  "after your visit        Who to contact     If you have questions or need follow up information about today's clinic visit or your schedule please contact [facility] directly at [phone].  Normal or non-critical lab and imaging results will be communicated to you by MyChart, letter or phone within 4 business days after the clinic has received the results. If you do not hear from us within 7 days, please contact the clinic through MyChart or phone. If you have a critical or abnormal lab result, we will notify you by phone as soon as possible.  Submit refill requests through [employer] or call your pharmacy and they will forward the refill request to us. Please allow 3 business days for your refill to be completed.          Additional Information About Your Visit        [facility] Information     [employer] lets you send messages to your doctor, view your test results, renew your prescriptions, schedule appointments and more. To sign up, go to [email or web address] . Click on \"Log in\" on the left side of the screen, which will take you to the Welcome page. Then click on \"Sign up Now\" on the right side of the page.     You will be asked to enter the access code listed below, as well as some personal information. Please follow the directions to create your username and password.     Your access code is: [email or web address]  Expires: 3/3/2019  9:31 AM     Your access code will  in 90 days. If you need help or a new code, please call your [place] clinic or [phone].        Care EveryWhere ID     This is your Care EveryWhere ID. This could be used by other organizations to access your [place] medical records  [id number]        Your Vitals Were     Pulse Temperature Respirations Height Last Period Pulse Oximetry    58 98.4  F (36.9  C) (Oral) 16 4' 8.75\" (1.441 m) 2018 99%    BMI (Body Mass Index)                   26.98 kg/m2            Blood Pressure from Last 3 Encounters:   18 121/75 "   06/30/18 132/64   10/03/17 106/70    Weight from Last 3 Encounters:   12/03/18 123 lb 9.6 oz (56.1 kg)   06/30/18 114 lb (51.7 kg)   10/03/17 113 lb (51.3 kg)              We Performed the Following     FLU VACCINE, SPLIT VIRUS, IM (QUADRIVALENT) [36727]- >3 YRS     HPV High Risk Types DNA Cervical     Pap imaged thin layer screen with HPV - recommended age 30 - 65 years (select HPV order below)     Vaccine Administration, Initial [20722]          Today's Medication Changes          These changes are accurate as of 12/3/18  9:37 AM.  If you have any questions, ask your nurse or doctor.               Start taking these medicines.        Dose/Directions    diclofenac 75 MG EC tablet   Commonly known as:  VOLTAREN   Used for:  Tendonitis of wrist, left   Started by:  Gale Jacob PA-C        Dose:  75 mg   Take 1 tablet (75 mg) by mouth 2 times daily as needed for moderate pain   Quantity:  30 tablet   Refills:  1       order for DME   Used for:  Tendonitis of wrist, left   Started by:  Gale Jacob PA-C        Equipment being ordered:   Quantity:  1 Device   Refills:  0            Where to get your medicines      These medications were sent to [facility]/pharmacy #[id number] - [place], [place]  [address]  [address] [facility] [address]     Phone:  [phone]     diclofenac 75 MG EC tablet         Some of these will need a paper prescription and others can be bought over the counter.  Ask your nurse if you have questions.     Bring a paper prescription for each of these medications     order for DME                Primary Care Provider Office Phone # Fax #    Gale Jacob PA-C [phone]       [address]  [facility] [address]        Equal Access to Services     WALDO GILES AH: Kiara Machado, waaxda luqadaha, qaybta kaalmada richardyaindiana, kaleigh gabriel. So [facility] [phone].    ATENCIÓN:  Si habla malvin, tiene a oquendo disposición servicios gratuitos de asistencia lingüística. Cliff fritz [phone].    We comply with applicable federal civil rights laws and [place] laws. We do not discriminate on the basis of race, color, national origin, age, disability, sex, sexual orientation, or gender identity.            Thank you!     Thank you for choosing [facility]  for your care. Our goal is always to provide you with excellent care. Hearing back from our patients is one way we can continue to improve our services. Please take a few minutes to complete the written survey that you may receive in the mail after your visit with us. Thank you!             Your Updated Medication List - Protect others around you: Learn how to safely use, store and throw away your medicines at www.disposemymeds.org.          This list is accurate as of 12/3/18  9:37 AM.  Always use your most recent med list.                   Brand Name Dispense Instructions for use Diagnosis    Dextromethorphan-Guaifenesin  MG Tb12     28 tablet    Take 1 tablet by mouth 2 times daily    Acute sinusitis with symptoms > 10 days       diclofenac 75 MG EC tablet    VOLTAREN    30 tablet    Take 1 tablet (75 mg) by mouth 2 times daily as needed for moderate pain    Tendonitis of wrist, left       methocarbamol 750 MG tablet    ROBAXIN    30 tablet    Take 1 tablet (750 mg) by mouth 3 times daily as needed for muscle spasms    Lumbar discitis       naproxen 500 MG tablet    NAPROSYN    30 tablet    Take 1 tablet (500 mg) by mouth 2 times daily (with meals)    Lumbar discitis       order for DME     1 Device    Equipment being ordered:    Tendonitis of wrist, left

## 2018-12-03 NOTE — PROGRESS NOTES
SUBJECTIVE:   CC: Stephanie Goodman is an 38 year old woman who presents for preventive health visit.     Healthy Habits:    Do you get at least three servings of calcium containing foods daily (dairy, green leafy vegetables, etc.)? no, taking calcium and/or vitamin D supplement: no    Amount of exercise or daily activities, outside of work: none    Problems taking medications regularly not applicable    Medication side effects: No    Have you had an eye exam in the past two years? yes    Do you see a dentist twice per year? yes    Do you have sleep apnea, excessive snoring or daytime drowsiness?no      Musculoskeletal problem/pain      Duration: 6 months    Description  Location: left wrist on and off pain     Intensity:  moderate    Accompanying signs and symptoms: none    History  Previous similar problem: YES- was given a brace but was not using it   Previous evaluation:  x-ray    Precipitating or alleviating factors:  Trauma or overuse: YES- overuse, patient cleans houses for a living. No trauma  Aggravating factors include: use of wrist    Therapies tried and outcome: nothing    Concern:    Patient has been feeling cold more than normal. Patient reports that when she feels cold even when her  feels normal temperature. She also sometimes gets cramps in feet and lower legs at night.  No other symptoms.     Today's PHQ-2 Score:   PHQ-2 ( 1999 Pfizer) 12/3/2018 10/3/2017   Q1: Little interest or pleasure in doing things 0 0   Q2: Feeling down, depressed or hopeless 0 0   PHQ-2 Score 0 0       Abuse: Current or Past(Physical, Sexual or Emotional)- No  Do you feel safe in your environment? Yes    Social History   Substance Use Topics     Smoking status: Never Smoker     Smokeless tobacco: Never Used     Alcohol use Yes     If you drink alcohol do you typically have >3 drinks per day or >7 drinks per week? No                     Reviewed orders with patient.  Reviewed health maintenance and updated orders  accordingly - Yes  BP Readings from Last 3 Encounters:   12/03/18 121/75   06/30/18 132/64   10/03/17 106/70    Wt Readings from Last 3 Encounters:   12/03/18 123 lb 9.6 oz (56.1 kg)   06/30/18 114 lb (51.7 kg)   10/03/17 113 lb (51.3 kg)                  Patient Active Problem List   Diagnosis     Lumbar discitis     Chronic bilateral low back pain without sciatica     Tendonitis of wrist, left     History reviewed. No pertinent surgical history.    Social History   Substance Use Topics     Smoking status: Never Smoker     Smokeless tobacco: Never Used     Alcohol use Yes     Family History   Problem Relation Age of Onset     Diabetes Mother          Current Outpatient Prescriptions   Medication Sig Dispense Refill     diclofenac (VOLTAREN) 75 MG EC tablet Take 1 tablet (75 mg) by mouth 2 times daily as needed for moderate pain 30 tablet 1     Dextromethorphan-Guaifenesin  MG TB12 Take 1 tablet by mouth 2 times daily (Patient not taking: Reported on 6/30/2018) 28 tablet 0     methocarbamol (ROBAXIN) 750 MG tablet Take 1 tablet (750 mg) by mouth 3 times daily as needed for muscle spasms (Patient not taking: Reported on 6/30/2018) 30 tablet 1     naproxen (NAPROSYN) 500 MG tablet Take 1 tablet (500 mg) by mouth 2 times daily (with meals) (Patient not taking: Reported on 6/30/2018) 30 tablet 2       Mammogram not appropriate for this patient based on age.    Pertinent mammograms are reviewed under the imaging tab.  History of abnormal Pap smear: NO - age 30-65 PAP every 5 years with negative HPV co-testing recommended     Reviewed and updated as needed this visit by clinical staff  Tobacco  Allergies  Meds  Problems  Med Hx  Surg Hx  Fam Hx  Soc Hx          Reviewed and updated as needed this visit by Provider  Allergies  Meds  Problems        ROS:  CONSTITUTIONAL: NEGATIVE for fever, chills, change in weight  INTEGUMENTARU/SKIN: NEGATIVE for worrisome rashes, moles or lesions  EYES: NEGATIVE for  "vision changes or irritation  ENT: NEGATIVE for ear, mouth and throat problems  RESP: NEGATIVE for significant cough or SOB  BREAST: NEGATIVE for masses, tenderness or discharge  CV: NEGATIVE for chest pain, palpitations or peripheral edema  GI: NEGATIVE for nausea, abdominal pain, heartburn, or change in bowel habits  : NEGATIVE for unusual urinary or vaginal symptoms. Periods are regular.  MUSCULOSKELETAL: NEGATIVE for significant arthralgias or myalgia  NEURO: NEGATIVE for weakness, dizziness or paresthesias  PSYCHIATRIC: NEGATIVE for changes in mood or affect    OBJECTIVE:   /75 (BP Location: Right arm, Patient Position: Sitting, Cuff Size: Adult Regular)  Pulse 58  Temp 98.4  F (36.9  C) (Oral)  Resp 16  Ht 4' 8.75\" (1.441 m)  Wt 123 lb 9.6 oz (56.1 kg)  LMP 11/03/2018  SpO2 99%  BMI 26.98 kg/m2  EXAM:  GENERAL: healthy, alert and no distress  EYES: Eyes grossly normal to inspection, PERRL and conjunctivae and sclerae normal  HENT: ear canals and TM's normal, nose and mouth without ulcers or lesions  NECK: no adenopathy, no asymmetry, masses, or scars and thyroid normal to palpation  RESP: lungs clear to auscultation - no rales, rhonchi or wheezes  BREAST: normal without masses, tenderness or nipple discharge and no palpable axillary masses or adenopathy  CV: regular rate and rhythm, normal S1 S2, no S3 or S4, no murmur, click or rub, no peripheral edema and peripheral pulses strong  ABDOMEN: soft, nontender, no hepatosplenomegaly, no masses and bowel sounds normal  MS: LUE exam shows normal strength and muscle mass, no deformities, no erythema, induration, or nodules, no evidence of joint effusion, ROM of all joints is normal and no evidence of joint instability, there is mild tenderness on palpation of soft tissue on the radial aspect   SKIN: no suspicious lesions or rashes  NEURO: Normal strength and tone, mentation intact and speech normal  PSYCH: mentation appears normal, affect " "normal/bright    Diagnostic Test Results:  none     ASSESSMENT/PLAN:       ICD-10-CM    1. Routine history and physical examination of adult Z00.00    2. Tendonitis of wrist, left M77.8 diclofenac (VOLTAREN) 75 MG EC tablet     DISCONTINUED: order for DME   3. Cold intolerance R68.89 **TSH with free T4 reflex FUTURE anytime     **CBC with platelets FUTURE anytime   4. Screening for malignant neoplasm of cervix Z12.4 Pap imaged thin layer screen with HPV - recommended age 30 - 65 years (select HPV order below)     HPV High Risk Types DNA Cervical   5. Need for prophylactic vaccination and inoculation against influenza Z23 FLU VACCINE, SPLIT VIRUS, IM (QUADRIVALENT) [03970]- >3 YRS     Vaccine Administration, Initial [72081]   6. Screening for thyroid disorder Z13.29 **TSH with free T4 reflex FUTURE anytime   7. Screening for diabetes mellitus Z13.1 **Glucose FUTURE anytime   8. Screening for lipid disorders Z13.220 Lipid panel reflex to direct LDL Fasting     2.Diclofenac 1 tablet twice a day  with food for 1 week, then use as needed for pain  Wear brace daily for 6 weeks     3.For cramps and cold intolerance   Drink 6 glasses of water 8 oz, daily to prevent cramping  Take multivitamin with magnesium, or magnesium supplement   Please follow up for blood work.     Patient is not fasting today, so she will follow up in near future to do blood work. Patient preferred today to have one appointment for fasting and non fasting blood work.   COUNSELING:   Reviewed preventive health counseling, as reflected in patient instructions       Regular exercise       Healthy diet/nutrition       Immunizations    Vaccinated for: Influenza      BP Readings from Last 1 Encounters:   12/03/18 121/75     Estimated body mass index is 26.98 kg/(m^2) as calculated from the following:    Height as of this encounter: 4' 8.75\" (1.441 m).    Weight as of this encounter: 123 lb 9.6 oz (56.1 kg).      Weight management plan: Discussed healthy " diet and exercise guidelines     reports that she has never smoked. She has never used smokeless tobacco.    Discussed with the patient that there are extra changes for issues addressed outside of preventative medicine during physical. Patient has agreed to potential changes.     Counseling Resources:  ATP IV Guidelines  Pooled Cohorts Equation Calculator  Breast Cancer Risk Calculator  FRAX Risk Assessment  ICSI Preventive Guidelines  Dietary Guidelines for Americans, 2010  USDA's MyPlate  ASA Prophylaxis  Lung CA Screening    Gale Jacob PA-C  [facility]    Injectable Influenza Immunization Documentation    1.  Is the person to be vaccinated sick today?   No    2. Does the person to be vaccinated have an allergy to a component   of the vaccine?   No  Egg Allergy Algorithm Link    3. Has the person to be vaccinated ever had a serious reaction   to influenza vaccine in the past?   No    4. Has the person to be vaccinated ever had Guillain-Barré syndrome?   No    Form completed by Patient

## 2018-12-03 NOTE — PATIENT INSTRUCTIONS
Diclofenac 1 tablet twice a day  with food for 1 week, then use as needed for pain  Wear brace daily for 6 weeks     Drink 6 glasses of water 8 oz, daily to prevent cramping  Take multivitamin with magnesium, or magnesium supplement     Preventive Health Recommendations  Female Ages 26 - 39  Yearly exam:   See your health care provider every year in order to    Review health changes.     Discuss preventive care.      Review your medicines if you your doctor has prescribed any.    Until age 30: Get a Pap test every three years (more often if you have had an abnormal result).    After age 30: Talk to your doctor about whether you should have a Pap test every 3 years or have a Pap test with HPV screening every 5 years.   You do not need a Pap test if your uterus was removed (hysterectomy) and you have not had cancer.  You should be tested each year for STDs (sexually transmitted diseases), if you're at risk.   Talk to your provider about how often to have your cholesterol checked.  If you are at risk for diabetes, you should have a diabetes test (fasting glucose).  Shots: Get a flu shot each year. Get a tetanus shot every 10 years.   Nutrition:     Eat at least 5 servings of fruits and vegetables each day.    Eat whole-grain bread, whole-wheat pasta and brown rice instead of white grains and rice.    Get adequate Calcium and Vitamin D.     Lifestyle    Exercise at least 150 minutes a week (30 minutes a day, 5 days of the week). This will help you control your weight and prevent disease.    Limit alcohol to one drink per day.    No smoking.     Wear sunscreen to prevent skin cancer.    See your dentist every six months for an exam and cleaning.    Wrist Pronation (Strength)    These instructions are for your right elbow. Switch sides for your left elbow.   1. Sit in a chair next to a table. Rest your right forearm on the table. Hang your right wrist off the edge of the table, palm up. Hold a hand weight in your right  hand. Your healthcare provider will tell you what size of hand weight to use.  2. Keep your forearm in place and turn your wrist to the left until your thumb is on top.  3. Slowly lower the hand weight back down to the right.  4. Repeat 10 times, or as instructed.  Date Last Reviewed: 3/10/2016    4818-6153 [employer]. [address] [place] [address]. All rights reserved. This information is not intended as a substitute for professional medical care. Always follow your healthcare professional's instructions.        Wrist Pronation (Strength)    These instructions are for your right elbow. Switch sides for your left elbow.   5. Sit in a chair next to a table. Rest your right forearm on the table. Hang your right wrist off the edge of the table, palm up. Hold a hand weight in your right hand. Your healthcare provider will tell you what size of hand weight to use.  6. Keep your forearm in place and turn your wrist to the left until your thumb is on top.  7. Slowly lower the hand weight back down to the right.  8. Repeat 10 times, or as instructed.  Date Last Reviewed: 3/10/2016    1577-9748 [employer]. [address] [place] [address]. All rights reserved. This information is not intended as a substitute for professional medical care. Always follow your healthcare professional's instructions.

## 2018-12-04 ENCOUNTER — HEALTH MAINTENANCE LETTER (OUTPATIENT)
Age: 38
End: 2018-12-04

## 2018-12-04 DIAGNOSIS — Z13.29 SCREENING FOR THYROID DISORDER: ICD-10-CM

## 2018-12-04 DIAGNOSIS — Z13.1 SCREENING FOR DIABETES MELLITUS: ICD-10-CM

## 2018-12-04 DIAGNOSIS — Z13.220 SCREENING FOR LIPID DISORDERS: ICD-10-CM

## 2018-12-04 DIAGNOSIS — R68.89 COLD INTOLERANCE: ICD-10-CM

## 2018-12-04 LAB
CHOLEST SERPL-MCNC: 206 MG/DL
ERYTHROCYTE [DISTWIDTH] IN BLOOD BY AUTOMATED COUNT: 14.3 % (ref 10–15)
GLUCOSE SERPL-MCNC: 75 MG/DL (ref 70–99)
HCT VFR BLD AUTO: 35.7 % (ref 35–47)
HDLC SERPL-MCNC: 53 MG/DL
HGB BLD-MCNC: 11.9 G/DL (ref 11.7–15.7)
LDLC SERPL CALC-MCNC: 111 MG/DL
MCH RBC QN AUTO: 29.2 PG (ref 26.5–33)
MCHC RBC AUTO-ENTMCNC: 33.3 G/DL (ref 31.5–36.5)
MCV RBC AUTO: 88 FL (ref 78–100)
NONHDLC SERPL-MCNC: 153 MG/DL
PLATELET # BLD AUTO: 263 10E9/L (ref 150–450)
RBC # BLD AUTO: 4.08 10E12/L (ref 3.8–5.2)
T4 FREE SERPL-MCNC: 0.92 NG/DL (ref 0.76–1.46)
TRIGL SERPL-MCNC: 208 MG/DL
TSH SERPL DL<=0.005 MIU/L-ACNC: 5.64 MU/L (ref 0.4–4)
WBC # BLD AUTO: 6.8 10E9/L (ref 4–11)

## 2018-12-04 PROCEDURE — 80061 LIPID PANEL: CPT | Performed by: PHYSICIAN ASSISTANT

## 2018-12-04 PROCEDURE — 85027 COMPLETE CBC AUTOMATED: CPT | Performed by: PHYSICIAN ASSISTANT

## 2018-12-04 PROCEDURE — 36415 COLL VENOUS BLD VENIPUNCTURE: CPT | Performed by: PHYSICIAN ASSISTANT

## 2018-12-04 PROCEDURE — 82947 ASSAY GLUCOSE BLOOD QUANT: CPT | Performed by: PHYSICIAN ASSISTANT

## 2018-12-04 PROCEDURE — 84443 ASSAY THYROID STIM HORMONE: CPT | Performed by: PHYSICIAN ASSISTANT

## 2018-12-04 PROCEDURE — 84439 ASSAY OF FREE THYROXINE: CPT | Performed by: PHYSICIAN ASSISTANT

## 2018-12-04 ASSESSMENT — ANXIETY QUESTIONNAIRES: GAD7 TOTAL SCORE: 1

## 2018-12-07 LAB
COPATH REPORT: NORMAL
PAP: NORMAL

## 2018-12-10 LAB
FINAL DIAGNOSIS: NORMAL
HPV HR 12 DNA CVX QL NAA+PROBE: NEGATIVE
HPV16 DNA SPEC QL NAA+PROBE: NEGATIVE
HPV18 DNA SPEC QL NAA+PROBE: NEGATIVE
SPECIMEN DESCRIPTION: NORMAL
SPECIMEN SOURCE CVX/VAG CYTO: NORMAL

## 2018-12-11 ENCOUNTER — TELEPHONE (OUTPATIENT)
Dept: URGENT CARE | Facility: URGENT CARE | Age: 38
End: 2018-12-11

## 2018-12-11 NOTE — TELEPHONE ENCOUNTER
...[facility]mikhail*  Patient's  called said he does not understand results from patients labs. Please call to discuss letter

## 2018-12-11 NOTE — TELEPHONE ENCOUNTER
Called and spoke to  with wife there by him. Informed that the TSH is not a diet based test. Scheduled recheck for 3 for labs.    Tereza Loera RN, [facility] Triage

## 2019-02-23 ENCOUNTER — DOCUMENTATION ONLY (OUTPATIENT)
Dept: LAB | Facility: CLINIC | Age: 39
End: 2019-02-23

## 2019-02-23 DIAGNOSIS — R79.89 ELEVATED TSH: Primary | ICD-10-CM

## 2019-02-23 NOTE — PROGRESS NOTES
Patient has a lab appointment on 3/4/2019. Per the lab schedule scrubbing protocol they are not due for anything. Please review chart and send orders or let patient know appointment is not needed.    Thank you,  Madison Hamilton

## 2019-03-04 DIAGNOSIS — R79.89 ELEVATED TSH: ICD-10-CM

## 2019-03-04 LAB — TSH SERPL DL<=0.005 MIU/L-ACNC: 2.37 MU/L (ref 0.4–4)

## 2019-03-04 PROCEDURE — 36415 COLL VENOUS BLD VENIPUNCTURE: CPT | Performed by: PHYSICIAN ASSISTANT

## 2019-03-04 PROCEDURE — 84443 ASSAY THYROID STIM HORMONE: CPT | Performed by: PHYSICIAN ASSISTANT

## 2019-12-09 ENCOUNTER — OFFICE VISIT (OUTPATIENT)
Dept: FAMILY MEDICINE | Facility: CLINIC | Age: 39
End: 2019-12-09
Payer: COMMERCIAL

## 2019-12-09 VITALS
HEIGHT: 57 IN | RESPIRATION RATE: 16 BRPM | SYSTOLIC BLOOD PRESSURE: 133 MMHG | BODY MASS INDEX: 26.19 KG/M2 | WEIGHT: 121.4 LBS | OXYGEN SATURATION: 99 % | DIASTOLIC BLOOD PRESSURE: 80 MMHG | TEMPERATURE: 97.7 F | HEART RATE: 57 BPM

## 2019-12-09 DIAGNOSIS — Z00.00 ROUTINE GENERAL MEDICAL EXAMINATION AT A HEALTH CARE FACILITY: Primary | ICD-10-CM

## 2019-12-09 PROCEDURE — 99395 PREV VISIT EST AGE 18-39: CPT | Performed by: FAMILY MEDICINE

## 2019-12-09 RX ORDER — IBUPROFEN 400 MG/1
800 TABLET, FILM COATED ORAL EVERY 6 HOURS PRN
COMMUNITY
End: 2022-03-15

## 2019-12-09 ASSESSMENT — MIFFLIN-ST. JEOR: SCORE: 1096.54

## 2019-12-09 ASSESSMENT — PAIN SCALES - GENERAL: PAINLEVEL: NO PAIN (0)

## 2019-12-09 NOTE — PROGRESS NOTES
SUBJECTIVE:   CC: Stephanie Goodman is an 39 year old woman who presents for preventive health visit.     Healthy Habits:    Do you get at least three servings of calcium containing foods daily (dairy, green leafy vegetables, etc.)? yes    Amount of exercise or daily activities, outside of work: No    Problems taking medications regularly No    Medication side effects: No    Have you had an eye exam in the past two years? yes    Do you see a dentist twice per year? yes    Do you have sleep apnea, excessive snoring or daytime drowsiness?no          Today's PHQ-2 Score:   PHQ-2 ( 1999 Pfizer) 12/9/2019 12/3/2018   Q1: Little interest or pleasure in doing things 0 0   Q2: Feeling down, depressed or hopeless 0 0   PHQ-2 Score 0 0       Abuse: Current or Past(Physical, Sexual or Emotional)- No  Do you feel safe in your environment? Yes        Social History     Tobacco Use     Smoking status: Never Smoker     Smokeless tobacco: Never Used   Substance Use Topics     Alcohol use: Yes     If you drink alcohol do you typically have >3 drinks per day or >7 drinks per week? No                     Reviewed orders with patient.  Reviewed health maintenance and updated orders accordingly - Yes      Mammogram not appropriate for this patient based on age.    Pertinent mammograms are reviewed under the imaging tab.  History of abnormal Pap smear: NO - age 30-65 PAP every 5 years with negative HPV co-testing recommended  PAP / HPV Latest Ref Rng & Units 12/3/2018   PAP - NIL   HPV 16 DNA NEG:Negative Negative   HPV 18 DNA NEG:Negative Negative   OTHER HR HPV NEG:Negative Negative     Reviewed and updated as needed this visit by clinical staff  Tobacco  Allergies  Meds  Problems  Med Hx  Surg Hx  Fam Hx  Soc Hx          Reviewed and updated as needed this visit by Provider  Tobacco  Allergies  Meds  Problems  Med Hx  Surg Hx  Fam Hx            ROS:  CONSTITUTIONAL: NEGATIVE for fever, chills, change in  "weight  INTEGUMENTARU/SKIN: NEGATIVE for worrisome rashes, moles or lesions  EYES: NEGATIVE for vision changes or irritation  ENT: NEGATIVE for ear, mouth and throat problems  RESP: NEGATIVE for significant cough or SOB  BREAST: NEGATIVE for masses, tenderness or discharge  CV: NEGATIVE for chest pain, palpitations or peripheral edema  GI: NEGATIVE for nausea, abdominal pain, heartburn, or change in bowel habits  : NEGATIVE for unusual urinary or vaginal symptoms. Periods are regular.  MUSCULOSKELETAL: NEGATIVE for significant arthralgias or myalgia  NEURO: NEGATIVE for weakness, dizziness or paresthesias  PSYCHIATRIC: NEGATIVE for changes in mood or affect    OBJECTIVE:   /80 (BP Location: Left arm, Patient Position: Chair, Cuff Size: Adult Regular)   Pulse 57   Temp 97.7  F (36.5  C) (Oral)   Resp 16   Ht 1.443 m (4' 8.81\")   Wt 55.1 kg (121 lb 6.4 oz)   LMP 12/09/2019 (Exact Date)   SpO2 99%   Breastfeeding No   BMI 26.45 kg/m    EXAM:  GENERAL: healthy, alert and no distress  EYES: Eyes grossly normal to inspection, PERRL and conjunctivae and sclerae normal  HENT: ear canals and TM's normal, nose and mouth without ulcers or lesions  NECK: no adenopathy, no asymmetry, masses, or scars and thyroid normal to palpation  RESP: lungs clear to auscultation - no rales, rhonchi or wheezes  BREAST: normal without masses, tenderness or nipple discharge and no palpable axillary masses or adenopathy  CV: regular rate and rhythm, normal S1 S2, no S3 or S4, no murmur, click or rub, no peripheral edema and peripheral pulses strong  ABDOMEN: soft, nontender, no hepatosplenomegaly, no masses and bowel sounds normal  MS: no gross musculoskeletal defects noted, no edema  SKIN: no suspicious lesions or rashes  NEURO: Normal strength and tone, mentation intact and speech normal  PSYCH: mentation appears normal, affect normal/bright    Diagnostic Test Results:  Labs reviewed in Epic    ASSESSMENT/PLAN:   1. Routine " "general medical examination at a health care facility  Routine preventive discussed.      COUNSELING:   Reviewed preventive health counseling, as reflected in patient instructions    Estimated body mass index is 26.45 kg/m  as calculated from the following:    Height as of this encounter: 1.443 m (4' 8.81\").    Weight as of this encounter: 55.1 kg (121 lb 6.4 oz).    Weight management plan: Discussed healthy diet and exercise guidelines     reports that she has never smoked. She has never used smokeless tobacco.      Counseling Resources:  ATP IV Guidelines  Pooled Cohorts Equation Calculator  Breast Cancer Risk Calculator  FRAX Risk Assessment  ICSI Preventive Guidelines  Dietary Guidelines for Americans, 2010  USDA's MyPlate  ASA Prophylaxis  Lung CA Screening    Donna Crabtree MD  [facility]  "

## 2020-11-19 ENCOUNTER — OFFICE VISIT (OUTPATIENT)
Dept: FAMILY MEDICINE | Facility: CLINIC | Age: 40
End: 2020-11-19
Payer: COMMERCIAL

## 2020-11-19 VITALS
OXYGEN SATURATION: 100 % | HEIGHT: 57 IN | HEART RATE: 56 BPM | BODY MASS INDEX: 26.1 KG/M2 | WEIGHT: 121 LBS | DIASTOLIC BLOOD PRESSURE: 77 MMHG | SYSTOLIC BLOOD PRESSURE: 135 MMHG

## 2020-11-19 DIAGNOSIS — Z00.00 ROUTINE GENERAL MEDICAL EXAMINATION AT A HEALTH CARE FACILITY: Primary | ICD-10-CM

## 2020-11-19 DIAGNOSIS — M62.9 MUSCULOSKELETAL DISORDER INVOLVING UPPER TRAPEZIUS MUSCLE: ICD-10-CM

## 2020-11-19 DIAGNOSIS — Z12.31 ENCOUNTER FOR SCREENING MAMMOGRAM FOR BREAST CANCER: ICD-10-CM

## 2020-11-19 DIAGNOSIS — K21.00 GASTROESOPHAGEAL REFLUX DISEASE WITH ESOPHAGITIS WITHOUT HEMORRHAGE: ICD-10-CM

## 2020-11-19 LAB
CHOLEST SERPL-MCNC: 217 MG/DL
GLUCOSE SERPL-MCNC: 81 MG/DL (ref 70–99)
HDLC SERPL-MCNC: 56 MG/DL
LDLC SERPL CALC-MCNC: 141 MG/DL
NONHDLC SERPL-MCNC: 161 MG/DL
TRIGL SERPL-MCNC: 102 MG/DL

## 2020-11-19 PROCEDURE — 99396 PREV VISIT EST AGE 40-64: CPT | Performed by: PHYSICIAN ASSISTANT

## 2020-11-19 PROCEDURE — 82947 ASSAY GLUCOSE BLOOD QUANT: CPT | Performed by: PHYSICIAN ASSISTANT

## 2020-11-19 PROCEDURE — 80061 LIPID PANEL: CPT | Performed by: PHYSICIAN ASSISTANT

## 2020-11-19 PROCEDURE — 36415 COLL VENOUS BLD VENIPUNCTURE: CPT | Performed by: PHYSICIAN ASSISTANT

## 2020-11-19 PROCEDURE — 99213 OFFICE O/P EST LOW 20 MIN: CPT | Mod: 25 | Performed by: PHYSICIAN ASSISTANT

## 2020-11-19 RX ORDER — METHOCARBAMOL 750 MG/1
750 TABLET, FILM COATED ORAL 3 TIMES DAILY PRN
Qty: 30 TABLET | Refills: 1 | Status: SHIPPED | OUTPATIENT
Start: 2020-11-19 | End: 2022-03-15

## 2020-11-19 RX ORDER — NAPROXEN 500 MG/1
500 TABLET ORAL 2 TIMES DAILY WITH MEALS
Qty: 30 TABLET | Refills: 1 | Status: SHIPPED | OUTPATIENT
Start: 2020-11-19 | End: 2022-03-15

## 2020-11-19 ASSESSMENT — MIFFLIN-ST. JEOR: SCORE: 1089.72

## 2020-11-19 ASSESSMENT — PAIN SCALES - GENERAL: PAINLEVEL: NO PAIN (0)

## 2020-11-19 NOTE — PROGRESS NOTES
SUBJECTIVE:   CC: Stephanie Goodman is an 40 year old woman who presents for preventive health visit.       Patient has been advised of split billing requirements and indicates understanding: Yes  Healthy Habits:    Do you get at least three servings of calcium containing foods daily (dairy, green leafy vegetables, etc.)? yes    Amount of exercise or daily activities, outside of work: none    Problems taking medications regularly not applicable    Medication side effects: No    Have you had an eye exam in the past two years? no    Do you see a dentist twice per year? yes    Do you have sleep apnea, excessive snoring or daytime drowsiness?no    GERD/Heartburn      Duration: a few months     Description (location/character/radiation): burning in throat, chest after eating certain foods    Intensity:  mild    Accompanying signs and symptoms:  food getting stuck: no   nausea/vomiting/blood: no   abdominal pain: no   black/tarry or bloody stools: no :    History (similar episodes/previous evaluation): None    Precipitating or alleviating factors:  worse with spicy foods, caffeinated drinks, alcohol and bananas.  current NSAID/Aspirin use: no     Therapies tried and outcome: none    Musculoskeletal problem/pain      Duration: a few months     Description  Location: upper shoulders bilaterally    Intensity:  moderate    Accompanying signs and symptoms: none    History  Previous similar problem: YES- on and off depends on activity  Previous evaluation:  none    Precipitating or alleviating factors:  Trauma or overuse: YES- overuse  Aggravating factors include: overuse    Therapies tried and outcome: nothing          Today's PHQ-2 Score:   PHQ-2 ( 1999 Pfizer) 11/19/2020 12/9/2019   Q1: Little interest or pleasure in doing things 0 0   Q2: Feeling down, depressed or hopeless 0 0   PHQ-2 Score 0 0       Abuse: Current or Past(Physical, Sexual or Emotional)- No  Do you feel safe in your environment? Yes        Social  History     Tobacco Use     Smoking status: Never Smoker     Smokeless tobacco: Never Used   Substance Use Topics     Alcohol use: Yes     If you drink alcohol do you typically have >3 drinks per day or >7 drinks per week? Not Applicable                     Reviewed orders with patient.  Reviewed health maintenance and updated orders accordingly - Yes  Patient Active Problem List   Diagnosis     Lumbar discitis     Chronic bilateral low back pain without sciatica     Tendonitis of wrist, left     History reviewed. No pertinent surgical history.    Social History     Tobacco Use     Smoking status: Never Smoker     Smokeless tobacco: Never Used   Substance Use Topics     Alcohol use: Yes     Family History   Problem Relation Age of Onset     Diabetes Mother          Current Outpatient Medications   Medication Sig Dispense Refill     methocarbamol (ROBAXIN) 750 MG tablet Take 1 tablet (750 mg) by mouth 3 times daily as needed for muscle spasms 30 tablet 1     naproxen (NAPROSYN) 500 MG tablet Take 1 tablet (500 mg) by mouth 2 times daily (with meals) 30 tablet 1     omeprazole (PRILOSEC) 20 MG DR capsule Take 1 capsule (20 mg) by mouth daily 30 capsule 2     ibuprofen (ADVIL/MOTRIN) 400 MG tablet Take 800 mg by mouth every 6 hours as needed for moderate pain       Allergies   Allergen Reactions     No Clinical Screening - See Comments        Mammogram Screening: Patient under age 50, mutual decision reflected in health maintenance.      Pertinent mammograms are reviewed under the imaging tab.  History of abnormal Pap smear: NO - age 30-65 PAP every 5 years with negative HPV co-testing recommended  PAP / HPV Latest Ref Rng & Units 12/3/2018   PAP - NIL   HPV 16 DNA NEG:Negative Negative   HPV 18 DNA NEG:Negative Negative   OTHER HR HPV NEG:Negative Negative     Reviewed and updated as needed this visit by clinical staff  Tobacco  Allergies  Meds  Problems  Med Hx  Surg Hx  Fam Hx  Soc Hx          Reviewed and  "updated as needed this visit by Provider  Tobacco  Allergies  Meds  Problems  Med Hx  Surg Hx  Fam Hx             ROS:  CONSTITUTIONAL: NEGATIVE for fever, chills, change in weight  INTEGUMENTARU/SKIN: NEGATIVE for worrisome rashes, moles or lesions  EYES: NEGATIVE for vision changes or irritation  ENT: NEGATIVE for ear, mouth and throat problems  RESP: NEGATIVE for significant cough or SOB  BREAST: NEGATIVE for masses, tenderness or discharge  CV: NEGATIVE for chest pain, palpitations or peripheral edema  GI: NEGATIVE for nausea, abdominal pain, heartburn, or change in bowel habits  : NEGATIVE for unusual urinary or vaginal symptoms. Periods are regular.  MUSCULOSKELETAL: NEGATIVE for significant arthralgias or myalgia  NEURO: NEGATIVE for weakness, dizziness or paresthesias  PSYCHIATRIC: NEGATIVE for changes in mood or affect    OBJECTIVE:   /77 (BP Location: Right arm, Patient Position: Chair, Cuff Size: Adult Regular)   Pulse 56   Ht 1.443 m (4' 8.81\")   Wt 54.9 kg (121 lb)   LMP 10/19/2020   SpO2 100%   BMI 26.36 kg/m       EXAM:  GENERAL: healthy, alert and no distress  EYES: Eyes grossly normal to inspection, PERRL and conjunctivae and sclerae normal  HENT: ear canals and TM's normal, nose and mouth without ulcers or lesions  NECK: no adenopathy, no asymmetry, masses, or scars and thyroid normal to palpation  RESP: lungs clear to auscultation - no rales, rhonchi or wheezes  BREAST: normal without masses, tenderness or nipple discharge and no palpable axillary masses or adenopathy  CV: regular rate and rhythm, normal S1 S2, no S3 or S4, no murmur, click or rub, no peripheral edema and peripheral pulses strong  ABDOMEN: soft, nontender, no hepatosplenomegaly, no masses and bowel sounds normal  MS: neck exam shows normal strength, no torticollis, ROM is normal and tenderness of the bilateral upper trapezius muscles   SKIN: no suspicious lesions or rashes  NEURO: Normal strength and tone, " "mentation intact and speech normal  PSYCH: mentation appears normal, affect normal/bright    Diagnostic Test Results:  Labs reviewed in Epic    ASSESSMENT/PLAN:       ICD-10-CM    1. Routine general medical examination at a health care facility  Z00.00 Lipid Profile (Chol, Trig, HDL, LDL calc)     Glucose   2. Encounter for screening mammogram for breast cancer  Z12.31 *MA Screening Digital Bilateral   3. Gastroesophageal reflux disease with esophagitis without hemorrhage  K21.00 omeprazole (PRILOSEC) 20 MG DR capsule   4. Musculoskeletal disorder involving upper trapezius muscle  M53.82 methocarbamol (ROBAXIN) 750 MG tablet     naproxen (NAPROSYN) 500 MG tablet     Omeprazole 20 mg daily on empty stomach every morning 30 mins before eating     Robaxin 1 tablet three times a day as needed after food for muscle pain  Naproxen 1 tablet twice a day as needed after food   Apply heating pad  massage  Patient has been advised of split billing requirements and indicates understanding: Yes  COUNSELING:   Reviewed preventive health counseling, as reflected in patient instructions       Regular exercise       Healthy diet/nutrition       Immunizations    Already received influenza          Estimated body mass index is 26.36 kg/m  as calculated from the following:    Height as of this encounter: 1.443 m (4' 8.81\").    Weight as of this encounter: 54.9 kg (121 lb).    Weight management plan: Discussed healthy diet and exercise guidelines    She reports that she has never smoked. She has never used smokeless tobacco.      Counseling Resources:  ATP IV Guidelines  Pooled Cohorts Equation Calculator  Breast Cancer Risk Calculator  BRCA-Related Cancer Risk Assessment: FHS-7 Tool  FRAX Risk Assessment  ICSI Preventive Guidelines  Dietary Guidelines for Americans, 2010  USDA's MyPlate  ASA Prophylaxis  Lung CA Screening    ADAIR Garcia [facility]  "

## 2020-11-19 NOTE — PATIENT INSTRUCTIONS
Omeprazole 20 mg daily on empty stomach every morning 30 mins before eating     Robaxin 1 tablet three times a day as needed after food for muscle pain  Naproxen 1 tablet twice a day as needed after food   Apply heating pad  massage    At [facility], we strive to deliver an exceptional experience to you, every time we see you. If you receive a survey, please complete it as we do value your feedback.  If you have MyChart, you can expect to receive results automatically within 24 hours of their completion.  Your provider will send a note interpreting your results as well.   If you do not have MyChart, you should receive your results in about a week by mail.    Your care team:                            Family Medicine Internal Medicine   MD Karel Gonzales, MD Tapan Cantu MD Katya Georgiev PA-C  Juanita Irizarry, APRN CNP    Solomon Sage, MD Pediatrics   Skip Elizondo, PA-C  Sofya Ford, CNP Alvina Velásquez, MD Kasandra Bucio APRN CNP   MD Abril Beauchamp MD Deborah Mielke, MD Kim Thein, APRN CNP  Francisca Black, PA-C  Chanda Somers, CNP  MD Candace Chilel MD Angela Wermerskirchen, MD      Clinic hours: Monday - Thursday 7 am-7 pm; Fridays 7 am-5 pm.   Urgent care: Monday - Friday 11 am-9 pm; Saturday and Sunday 9 am-5 pm.    Clinic: [phone]       [facility]: Monday - Thursday 8 am - 7 pm; Friday 8 am - 6 pm  [facility]: [phone]     Use www.[email or web address].org for 24/7 diagnosis and treatment of dozens of conditions.    Preventive Health Recommendations  Female Ages 40 to 49    Yearly exam:     See your health care provider every year in order to  1. Review health changes.   2. Discuss preventive care.    3. Review your medicines if your doctor prescribed any.      Get a Pap test every three years (unless you have an abnormal result and  your provider advises testing more often).      If you get Pap tests with HPV test, you only need to test every 5 years, unless you have an abnormal result. You do not need a Pap test if your uterus was removed (hysterectomy) and you have not had cancer.      You should be tested each year for STDs (sexually transmitted diseases), if you're at risk.     Ask your doctor if you should have a mammogram.      Have a colonoscopy (test for colon cancer) if someone in your family has had colon cancer or polyps before age 50.       Have a cholesterol test every 5 years.       Have a diabetes test (fasting glucose) after age 45. If you are at risk for diabetes, you should have this test every 3 years.    Shots: Get a flu shot each year. Get a tetanus shot every 10 years.     Nutrition:     Eat at least 5 servings of fruits and vegetables each day.    Eat whole-grain bread, whole-wheat pasta and brown rice instead of white grains and rice.    Get adequate Calcium and Vitamin D.      Lifestyle    Exercise at least 150 minutes a week (an average of 30 minutes a day, 5 days a week). This will help you control your weight and prevent disease.    Limit alcohol to one drink per day.    No smoking.     Wear sunscreen to prevent skin cancer.    See your dentist every six months for an exam and cleaning.

## 2021-01-05 ENCOUNTER — ANCILLARY PROCEDURE (OUTPATIENT)
Dept: MAMMOGRAPHY | Facility: CLINIC | Age: 41
End: 2021-01-05
Attending: PHYSICIAN ASSISTANT
Payer: COMMERCIAL

## 2021-01-05 DIAGNOSIS — Z12.31 ENCOUNTER FOR SCREENING MAMMOGRAM FOR BREAST CANCER: ICD-10-CM

## 2021-01-05 PROCEDURE — 77067 SCR MAMMO BI INCL CAD: CPT | Mod: TC | Performed by: RADIOLOGY

## 2021-01-07 NOTE — RESULT ENCOUNTER NOTE
Please call the patient with these results:      Screening mammogram was abnormal on the right side. Patient needs to call  imaging center to schedule diagnotic mammogram and US of the right breast. Orders are placed. Call [phone]    Thank you   Steph Dodge PAC

## 2021-01-15 ENCOUNTER — ANCILLARY PROCEDURE (OUTPATIENT)
Dept: MAMMOGRAPHY | Facility: CLINIC | Age: 41
End: 2021-01-15
Attending: PHYSICIAN ASSISTANT
Payer: COMMERCIAL

## 2021-01-15 ENCOUNTER — ANCILLARY PROCEDURE (OUTPATIENT)
Dept: ULTRASOUND IMAGING | Facility: CLINIC | Age: 41
End: 2021-01-15
Attending: PHYSICIAN ASSISTANT
Payer: COMMERCIAL

## 2021-01-15 DIAGNOSIS — R92.8 ABNORMAL MAMMOGRAM: ICD-10-CM

## 2021-01-15 PROCEDURE — G0279 TOMOSYNTHESIS, MAMMO: HCPCS | Performed by: RADIOLOGY

## 2021-01-15 PROCEDURE — 76642 ULTRASOUND BREAST LIMITED: CPT | Mod: RT | Performed by: RADIOLOGY

## 2021-01-15 PROCEDURE — 77065 DX MAMMO INCL CAD UNI: CPT | Performed by: RADIOLOGY

## 2021-01-18 ENCOUNTER — ANCILLARY PROCEDURE (OUTPATIENT)
Dept: MAMMOGRAPHY | Facility: CLINIC | Age: 41
End: 2021-01-18
Attending: PHYSICIAN ASSISTANT
Payer: COMMERCIAL

## 2021-01-18 ENCOUNTER — ANCILLARY PROCEDURE (OUTPATIENT)
Dept: ULTRASOUND IMAGING | Facility: CLINIC | Age: 41
End: 2021-01-18
Attending: PHYSICIAN ASSISTANT
Payer: COMMERCIAL

## 2021-01-18 DIAGNOSIS — R92.8 ABNORMAL MAMMOGRAM: ICD-10-CM

## 2021-01-18 PROCEDURE — 19083 BX BREAST 1ST LESION US IMAG: CPT | Mod: RT | Performed by: RADIOLOGY

## 2021-01-18 PROCEDURE — 88305 TISSUE EXAM BY PATHOLOGIST: CPT | Performed by: RADIOLOGY

## 2021-01-18 RX ORDER — LIDOCAINE HYDROCHLORIDE 10 MG/ML
9 INJECTION, SOLUTION EPIDURAL; INFILTRATION; INTRACAUDAL; PERINEURAL ONCE
Status: COMPLETED | OUTPATIENT
Start: 2021-01-18 | End: 2021-01-18

## 2021-01-18 RX ADMIN — LIDOCAINE HYDROCHLORIDE 9 ML: 10 INJECTION, SOLUTION EPIDURAL; INFILTRATION; INTRACAUDAL; PERINEURAL at 12:03

## 2021-01-20 ENCOUNTER — APPOINTMENT (OUTPATIENT)
Dept: INTERPRETER SERVICES | Facility: CLINIC | Age: 41
End: 2021-01-20
Payer: COMMERCIAL

## 2021-01-20 ENCOUNTER — TELEPHONE (OUTPATIENT)
Dept: GENERAL RADIOLOGY | Facility: CLINIC | Age: 41
End: 2021-01-20

## 2021-01-20 LAB — COPATH REPORT: NORMAL

## 2022-03-15 ENCOUNTER — OFFICE VISIT (OUTPATIENT)
Dept: FAMILY MEDICINE | Facility: CLINIC | Age: 42
End: 2022-03-15
Payer: COMMERCIAL

## 2022-03-15 ENCOUNTER — TELEPHONE (OUTPATIENT)
Dept: FAMILY MEDICINE | Facility: CLINIC | Age: 42
End: 2022-03-15

## 2022-03-15 VITALS
BODY MASS INDEX: 27.18 KG/M2 | HEART RATE: 62 BPM | HEIGHT: 57 IN | OXYGEN SATURATION: 100 % | TEMPERATURE: 97.3 F | RESPIRATION RATE: 18 BRPM | SYSTOLIC BLOOD PRESSURE: 132 MMHG | DIASTOLIC BLOOD PRESSURE: 76 MMHG | WEIGHT: 126 LBS

## 2022-03-15 DIAGNOSIS — Z13.1 SCREENING FOR DIABETES MELLITUS: ICD-10-CM

## 2022-03-15 DIAGNOSIS — Z11.4 SCREENING FOR HIV (HUMAN IMMUNODEFICIENCY VIRUS): ICD-10-CM

## 2022-03-15 DIAGNOSIS — N89.8 VAGINAL ITCHING: ICD-10-CM

## 2022-03-15 DIAGNOSIS — B96.89 BACTERIAL VAGINOSIS: ICD-10-CM

## 2022-03-15 DIAGNOSIS — Z00.00 ROUTINE GENERAL MEDICAL EXAMINATION AT A HEALTH CARE FACILITY: Primary | ICD-10-CM

## 2022-03-15 DIAGNOSIS — B37.31 CANDIDIASIS OF VAGINA: ICD-10-CM

## 2022-03-15 DIAGNOSIS — Z13.6 CARDIOVASCULAR SCREENING; LDL GOAL LESS THAN 160: ICD-10-CM

## 2022-03-15 DIAGNOSIS — N76.0 BACTERIAL VAGINOSIS: ICD-10-CM

## 2022-03-15 DIAGNOSIS — Z12.31 VISIT FOR SCREENING MAMMOGRAM: ICD-10-CM

## 2022-03-15 DIAGNOSIS — Z23 ENCOUNTER FOR IMMUNIZATION: ICD-10-CM

## 2022-03-15 DIAGNOSIS — R63.5 WEIGHT GAIN: ICD-10-CM

## 2022-03-15 DIAGNOSIS — Z11.59 NEED FOR HEPATITIS C SCREENING TEST: ICD-10-CM

## 2022-03-15 LAB
ALBUMIN SERPL-MCNC: 3.5 G/DL (ref 3.4–5)
ALP SERPL-CCNC: 53 U/L (ref 40–150)
ALT SERPL W P-5'-P-CCNC: 22 U/L (ref 0–50)
ANION GAP SERPL CALCULATED.3IONS-SCNC: 7 MMOL/L (ref 3–14)
AST SERPL W P-5'-P-CCNC: 19 U/L (ref 0–45)
BILIRUB SERPL-MCNC: 0.4 MG/DL (ref 0.2–1.3)
BUN SERPL-MCNC: 10 MG/DL (ref 7–30)
CALCIUM SERPL-MCNC: 9 MG/DL (ref 8.5–10.1)
CHLORIDE BLD-SCNC: 106 MMOL/L (ref 94–109)
CHOLEST SERPL-MCNC: 185 MG/DL
CLUE CELLS: PRESENT
CO2 SERPL-SCNC: 25 MMOL/L (ref 20–32)
CREAT SERPL-MCNC: 0.52 MG/DL (ref 0.52–1.04)
FASTING STATUS PATIENT QL REPORTED: YES
GFR SERPL CREATININE-BSD FRML MDRD: >90 ML/MIN/1.73M2
GLUCOSE BLD-MCNC: 87 MG/DL (ref 70–99)
HCV AB SERPL QL IA: NONREACTIVE
HDLC SERPL-MCNC: 49 MG/DL
HIV 1+2 AB+HIV1 P24 AG SERPL QL IA: NONREACTIVE
LDLC SERPL CALC-MCNC: 96 MG/DL
NONHDLC SERPL-MCNC: 136 MG/DL
POTASSIUM BLD-SCNC: 3.4 MMOL/L (ref 3.4–5.3)
PROT SERPL-MCNC: 7.6 G/DL (ref 6.8–8.8)
SODIUM SERPL-SCNC: 138 MMOL/L (ref 133–144)
TRICHOMONAS, WET PREP: ABNORMAL
TRIGL SERPL-MCNC: 202 MG/DL
TSH SERPL DL<=0.005 MIU/L-ACNC: 3.69 MU/L (ref 0.4–4)
WBC'S/HIGH POWER FIELD, WET PREP: ABNORMAL
YEAST, WET PREP: PRESENT

## 2022-03-15 PROCEDURE — 84443 ASSAY THYROID STIM HORMONE: CPT | Performed by: FAMILY MEDICINE

## 2022-03-15 PROCEDURE — 36415 COLL VENOUS BLD VENIPUNCTURE: CPT | Performed by: FAMILY MEDICINE

## 2022-03-15 PROCEDURE — 99213 OFFICE O/P EST LOW 20 MIN: CPT | Mod: 25 | Performed by: FAMILY MEDICINE

## 2022-03-15 PROCEDURE — 90471 IMMUNIZATION ADMIN: CPT | Performed by: FAMILY MEDICINE

## 2022-03-15 PROCEDURE — 80053 COMPREHEN METABOLIC PANEL: CPT | Performed by: FAMILY MEDICINE

## 2022-03-15 PROCEDURE — 99396 PREV VISIT EST AGE 40-64: CPT | Mod: 25 | Performed by: FAMILY MEDICINE

## 2022-03-15 PROCEDURE — 90686 IIV4 VACC NO PRSV 0.5 ML IM: CPT | Performed by: FAMILY MEDICINE

## 2022-03-15 PROCEDURE — 87210 SMEAR WET MOUNT SALINE/INK: CPT | Performed by: FAMILY MEDICINE

## 2022-03-15 PROCEDURE — 87389 HIV-1 AG W/HIV-1&-2 AB AG IA: CPT | Performed by: FAMILY MEDICINE

## 2022-03-15 PROCEDURE — 80061 LIPID PANEL: CPT | Performed by: FAMILY MEDICINE

## 2022-03-15 PROCEDURE — 86803 HEPATITIS C AB TEST: CPT | Performed by: FAMILY MEDICINE

## 2022-03-15 RX ORDER — FLUCONAZOLE 150 MG/1
150 TABLET ORAL ONCE
Qty: 1 TABLET | Refills: 0 | Status: SHIPPED | OUTPATIENT
Start: 2022-03-15 | End: 2022-03-15

## 2022-03-15 RX ORDER — METRONIDAZOLE 500 MG/1
500 TABLET ORAL 2 TIMES DAILY
Qty: 14 TABLET | Refills: 0 | Status: SHIPPED | OUTPATIENT
Start: 2022-03-15 | End: 2022-03-22

## 2022-03-15 ASSESSMENT — ENCOUNTER SYMPTOMS
HEADACHES: 1
BREAST MASS: 0

## 2022-03-15 ASSESSMENT — PAIN SCALES - GENERAL: PAINLEVEL: NO PAIN (0)

## 2022-03-15 NOTE — PATIENT INSTRUCTIONS
At [facility], we strive to deliver an exceptional experience to you, every time we see you. If you receive a survey, please complete it as we do value your feedback.  If you have MyChart, you can expect to receive results automatically within 24 hours of their completion.  Your provider will send a note interpreting your results as well.   If you do not have MyChart, you should receive your results in about a week by mail.    Your care team:                            Family Medicine Internal Medicine   MD Karel Gonzales MD Shantel Branch-Fleming, MD Srinivasa Vaka, MD Katya Belousova, ADAIR PerezHillSIGRID Jimenez CNP, MD Pediatrics   Skip Elizondo, MD Alvina Grande MD Amelia Massimini APRN CNP   Megan Gallegos APRN CNP Abril Luong MD             Clinic hours: Monday - Thursday 7 am-6 pm; Fridays 7 am-5 pm.   Urgent care: Monday - Friday 10 am- 8 pm; Saturday and Sunday 9 am-5 pm.    Clinic: [phone]       [facility]: Monday - Thursday 8 am - 7 pm; Friday 8 am - 6 pm  [facility]: [phone]       Preventive Health Recommendations  Female Ages 40 to 49    Yearly exam:     See your health care provider every year in order to  1. Review health changes.   2. Discuss preventive care.    3. Review your medicines if your doctor prescribed any.      Get a Pap test every three years (unless you have an abnormal result and your provider advises testing more often).      If you get Pap tests with HPV test, you only need to test every 5 years, unless you have an abnormal result. You do not need a Pap test if your uterus was removed (hysterectomy) and you have not had cancer.      You should be tested each year for STDs (sexually transmitted diseases), if you're at risk.     Ask your doctor if you should have a mammogram.      Have a colonoscopy (test for colon  cancer) if someone in your family has had colon cancer or polyps before age 50.       Have a cholesterol test every 5 years.       Have a diabetes test (fasting glucose) after age 45. If you are at risk for diabetes, you should have this test every 3 years.    Shots: Get a flu shot each year. Get a tetanus shot every 10 years.     Nutrition:     Eat at least 5 servings of fruits and vegetables each day.    Eat whole-grain bread, whole-wheat pasta and brown rice instead of white grains and rice.    Get adequate Calcium and Vitamin D.      Lifestyle    Exercise at least 150 minutes a week (an average of 30 minutes a day, 5 days a week). This will help you control your weight and prevent disease.    Limit alcohol to one drink per day.    No smoking.     Wear sunscreen to prevent skin cancer.    See your dentist every six months for an exam and cleaning.

## 2022-03-15 NOTE — PROGRESS NOTES
SUBJECTIVE:   CC: Stephanie Goodman is an 41 year old woman who presents for preventive health visit.       Patient has been advised of split billing requirements and indicates understanding: Yes  Healthy Habits:     Getting at least 3 servings of Calcium per day:  Yes    Bi-annual eye exam:  NO    Dental care twice a year:  Yes    Sleep apnea or symptoms of sleep apnea:  None    Diet:  Regular (no restrictions)    Frequency of exercise:  4-5 days/week    Duration of exercise:  30-45 minutes    Taking medications regularly:  Yes    Medication side effects:  None    PHQ-2 Total Score: 0    Additional concerns today:  No    Patient c/o intermittent vaginal itching. Patient c/o difficulty with weight loss with diet and exercise.    Today's PHQ-2 Score:   PHQ-2 ( 1999 Pfizer) 3/15/2022   Q1: Little interest or pleasure in doing things 0   Q2: Feeling down, depressed or hopeless 0   PHQ-2 Score 0   PHQ-2 Total Score (12-17 Years)- Positive if 3 or more points; Administer PHQ-A if positive -   Q1: Little interest or pleasure in doing things Not at all   Q2: Feeling down, depressed or hopeless Not at all   PHQ-2 Score 0       Abuse: Current or Past (Physical, Sexual or Emotional) - NO  Do you feel safe in your environment? YES    Have you ever done Advance Care Planning? (For example, a Health Directive, POLST, or a discussion with a medical provider or your loved ones about your wishes): No, advance care planning information given to patient to review.  Patient plans to discuss their wishes with loved ones or provider.      Social History     Tobacco Use     Smoking status: Never Smoker     Smokeless tobacco: Never Used   Substance Use Topics     Alcohol use: Yes     If you drink alcohol do you typically have >3 drinks per day or >7 drinks per week? No    Alcohol Use 3/15/2022   Prescreen: >3 drinks/day or >7 drinks/week? No   Prescreen: >3 drinks/day or >7 drinks/week? -       Reviewed orders with patient.  Reviewed  "health maintenance and updated orders accordingly - Yes  Lab work is in process    Breast Cancer Screening:    Breast CA Risk Assessment (FHS-7) 3/15/2022   Do you have a family history of breast, colon, or ovarian cancer? No / Unknown         History of abnormal Pap smear: NO - age 30-65 PAP every 5 years with negative HPV co-testing recommended  PAP / HPV Latest Ref Rng & Units 12/3/2018   PAP (Historical) - NIL   HPV16 NEG:Negative Negative   HPV18 NEG:Negative Negative   HRHPV NEG:Negative Negative     Reviewed and updated as needed this visit by clinical staff   Tobacco  Allergies  Meds  Problems  Med Hx  Surg Hx  Fam Hx  Soc   Hx        Reviewed and updated as needed this visit by Provider                     Review of Systems   Breasts:  Negative for tenderness, breast mass and discharge.   Genitourinary: Negative for pelvic pain, vaginal bleeding and vaginal discharge.   Neurological: Positive for headaches.     CONSTITUTIONAL: NEGATIVE for fever, chills, change in weight  INTEGUMENTARU/SKIN: NEGATIVE for worrisome rashes, moles or lesions  EYES: NEGATIVE for vision changes or irritation  ENT: NEGATIVE for ear, mouth and throat problems  RESP: NEGATIVE for significant cough or SOB  BREAST: NEGATIVE for masses, tenderness or discharge  CV: NEGATIVE for chest pain, palpitations or peripheral edema  GI: NEGATIVE for nausea, abdominal pain, heartburn, or change in bowel habits  : NEGATIVE for unusual urinary or vaginal symptoms. Periods are regular.  MUSCULOSKELETAL: NEGATIVE for significant arthralgias or myalgia  NEURO: NEGATIVE for weakness, dizziness or paresthesias  PSYCHIATRIC: NEGATIVE for changes in mood or affect     OBJECTIVE:   /76 (BP Location: Left arm, Patient Position: Chair, Cuff Size: Adult Regular)   Pulse 62   Temp 97.3  F (36.3  C) (Tympanic)   Resp 18   Ht 1.441 m (4' 8.75\")   Wt 57.2 kg (126 lb)   LMP 02/22/2022   SpO2 100%   BMI 27.51 kg/m    Physical " Exam  EXAM:  Constitutional: healthy, alert and no distress   Cardiovascular: negative, PMI normal. No lifts, heaves, or thrills. RRR. No murmurs, clicks gallops or rub  Respiratory: negative, Percussion normal. Good diaphragmatic excursion. Lungs clear  Psychiatric: mentation appears normal and affect normal/bright  Neck: Neck supple. No adenopathy. Thyroid symmetric, normal size,, Carotids without bruits.  Abdomen: Abdomen soft, non-tender. BS normal. No masses, organomegaly  LYMPH: Normal cervical lymph nodes      Diagnostic Test Results:  Labs reviewed in Epic    ASSESSMENT/PLAN:   (Z00.00) Routine general medical examination at a health care facility  (primary encounter diagnosis)  Comment:   Plan: as below.    (Z13.6) CARDIOVASCULAR SCREENING; LDL GOAL LESS THAN 160  Comment:   Plan: Comprehensive metabolic panel (BMP + Alb, Alk         Phos, ALT, AST, Total. Bili, TP), Lipid panel         reflex to direct LDL Fasting            (Z13.1) Screening for diabetes mellitus  Comment:   Plan: Comprehensive metabolic panel (BMP + Alb, Alk         Phos, ALT, AST, Total. Bili, TP)            (Z11.4) Screening for HIV (human immunodeficiency virus)  Comment:   Plan: HIV Antigen Antibody Combo            (Z11.59) Need for hepatitis C screening test  Comment:   Plan: Hepatitis C Screen Reflex to HCV RNA Quant and         Genotype            (Z12.31) Visit for screening mammogram  Comment:   Plan: *MA Screening Digital Bilateral            (R63.5) Weight gain  Comment:   Plan: TSH with free T4 reflex            (N89.8) Vaginal itching  Comment:   Plan: Wet prep - lab collect            (Z23) Encounter for immunization  Comment:   Plan: INFLUENZA VACCINE IM > 6 MONTHS VALENT IIV4         (AFLURIA/FLUZONE)            COUNSELING:  Reviewed preventive health counseling, as reflected in patient instructions       Regular exercise       Healthy diet/nutrition       Vision screening    Estimated body mass index is 27.51 kg/m  as  "calculated from the following:    Height as of this encounter: 1.441 m (4' 8.75\").    Weight as of this encounter: 57.2 kg (126 lb).        She reports that she has never smoked. She has never used smokeless tobacco.      Counseling Resources:  ATP IV Guidelines  Pooled Cohorts Equation Calculator  Breast Cancer Risk Calculator  BRCA-Related Cancer Risk Assessment: FHS-7 Tool  FRAX Risk Assessment  ICSI Preventive Guidelines  Dietary Guidelines for Americans, 2010  USDA's MyPlate  ASA Prophylaxis  Lung CA Screening    Soloomn Sage MD, MD  [facility]  "

## 2022-03-15 NOTE — NURSING NOTE
Prior to immunization administration, verified patients identity using patient s name and date of birth. Please see Immunization Activity for additional information.     Screening Questionnaire for Adult Immunization    Are you sick today?   No   Do you have allergies to medications, food, a vaccine component or latex?   No   Have you ever had a serious reaction after receiving a vaccination?   No   Do you have a long-term health problem with heart, lung, kidney, or metabolic disease (e.g., diabetes), asthma, a blood disorder, no spleen, complement component deficiency, a cochlear implant, or a spinal fluid leak?  Are you on long-term aspirin therapy?   No   Do you have cancer, leukemia, HIV/AIDS, or any other immune system problem?   No   Do you have a parent, brother, or sister with an immune system problem?   No   In the past 3 months, have you taken medications that affect  your immune system, such as prednisone, other steroids, or anticancer drugs; drugs for the treatment of rheumatoid arthritis, Crohn s disease, or psoriasis; or have you had radiation treatments?   No   Have you had a seizure, or a brain or other nervous system problem?   No   During the past year, have you received a transfusion of blood or blood    products, or been given immune (gamma) globulin or antiviral drug?   No   For women: Are you pregnant or is there a chance you could become       pregnant during the next month?   No   Have you received any vaccinations in the past 4 weeks?   No     Immunization questionnaire answers were all negative.        Per orders of Dr. badillo, injection of fluzone given by Neema Ny. Patient instructed to remain in clinic for 15 minutes afterwards, and to report any adverse reaction to me immediately.       Screening performed by Neema Ny on 3/15/2022 at 9:30 AM.

## 2022-03-15 NOTE — LETTER
March 15, 2022      Stephanie Goodman  [address] [place] [place] [address]        Dear Ms.Chiqui Goodman,    We are writing to inform you of your test results.    Your kidney, liver, electrolyte, blood sugar, thyroid and cholesterol tests were normal for you. Your HIV and hepatitis c tests were negative. Please follow up in  year for routine physical for recheck.    Resulted Orders   HIV Antigen Antibody Combo   Result Value Ref Range    HIV Antigen Antibody Combo Nonreactive Nonreactive      Comment:      HIV-1 p24 Ag & HIV-1/HIV-2 Ab Not Detected   Hepatitis C Screen Reflex to HCV RNA Quant and Genotype   Result Value Ref Range    Hepatitis C Antibody Nonreactive Nonreactive    Narrative    Assay performance characteristics have not been established for newborns, infants, and children.   Comprehensive metabolic panel (BMP + Alb, Alk Phos, ALT, AST, Total. Bili, TP)   Result Value Ref Range    Sodium 138 133 - 144 mmol/L    Potassium 3.4 3.4 - 5.3 mmol/L    Chloride 106 94 - 109 mmol/L    Carbon Dioxide (CO2) 25 20 - 32 mmol/L    Anion Gap 7 3 - 14 mmol/L    Urea Nitrogen 10 7 - 30 mg/dL    Creatinine 0.52 0.52 - 1.04 mg/dL    Calcium 9.0 8.5 - 10.1 mg/dL    Glucose 87 70 - 99 mg/dL    Alkaline Phosphatase 53 40 - 150 U/L    AST 19 0 - 45 U/L    ALT 22 0 - 50 U/L    Protein Total 7.6 6.8 - 8.8 g/dL    Albumin 3.5 3.4 - 5.0 g/dL    Bilirubin Total 0.4 0.2 - 1.3 mg/dL    GFR Estimate >90 >60 mL/min/1.73m2      Comment:      Effective December 21, 2021 eGFRcr in adults is calculated using the 2021 CKD-EPI creatinine equation which includes age and gender (Nicolás et al., NEJM, DOI: 10.1056/BVBSvt4070736)   Lipid panel reflex to direct LDL Fasting   Result Value Ref Range    Cholesterol 185 <200 mg/dL    Triglycerides 202 (H) <150 mg/dL    Direct Measure HDL 49 (L) >=50 mg/dL    LDL Cholesterol Calculated 96 <=100 mg/dL    Non HDL Cholesterol 136 (H) <130 mg/dL    Patient Fasting > 8hrs? Yes     Narrative     Cholesterol  Desirable:  <200 mg/dL    Triglycerides  Normal:  Less than 150 mg/dL  Borderline High:  150-199 mg/dL  High:  200-499 mg/dL  Very High:  Greater than or equal to 500 mg/dL    Direct Measure HDL  Female:  Greater than or equal to 50 mg/dL   Male:  Greater than or equal to 40 mg/dL    LDL Cholesterol  Desirable:  <100mg/dL  Above Desirable:  100-129 mg/dL   Borderline High:  130-159 mg/dL   High:  160-189 mg/dL   Very High:  >= 190 mg/dL    Non HDL Cholesterol  Desirable:  130 mg/dL  Above Desirable:  130-159 mg/dL  Borderline High:  160-189 mg/dL  High:  190-219 mg/dL  Very High:  Greater than or equal to 220 mg/dL   TSH with free T4 reflex   Result Value Ref Range    TSH 3.69 0.40 - 4.00 mU/L   Wet prep - lab collect   Result Value Ref Range    Trichomonas Absent Absent    Yeast Present (A) Absent    Clue Cells Present (A) Absent    WBCs/high power field 2+ (A) None       If you have any questions or concerns, please call the clinic at the number listed above.       Sincerely,      Solomon Sage MD

## 2022-04-26 ENCOUNTER — ANCILLARY PROCEDURE (OUTPATIENT)
Dept: MAMMOGRAPHY | Facility: CLINIC | Age: 42
End: 2022-04-26
Attending: FAMILY MEDICINE
Payer: COMMERCIAL

## 2022-04-26 DIAGNOSIS — Z12.31 VISIT FOR SCREENING MAMMOGRAM: ICD-10-CM

## 2022-04-26 PROCEDURE — 77067 SCR MAMMO BI INCL CAD: CPT | Mod: TC | Performed by: RADIOLOGY

## 2022-04-26 PROCEDURE — 77063 BREAST TOMOSYNTHESIS BI: CPT | Mod: TC | Performed by: RADIOLOGY

## 2023-01-04 ENCOUNTER — OFFICE VISIT (OUTPATIENT)
Dept: URGENT CARE | Facility: URGENT CARE | Age: 43
End: 2023-01-04
Payer: COMMERCIAL

## 2023-01-04 ENCOUNTER — ANCILLARY PROCEDURE (OUTPATIENT)
Dept: GENERAL RADIOLOGY | Facility: CLINIC | Age: 43
End: 2023-01-04
Attending: PHYSICIAN ASSISTANT
Payer: COMMERCIAL

## 2023-01-04 VITALS
DIASTOLIC BLOOD PRESSURE: 83 MMHG | SYSTOLIC BLOOD PRESSURE: 153 MMHG | BODY MASS INDEX: 27.99 KG/M2 | WEIGHT: 128.2 LBS | TEMPERATURE: 99.8 F | HEART RATE: 76 BPM | OXYGEN SATURATION: 100 %

## 2023-01-04 DIAGNOSIS — J11.1 INFLUENZA-LIKE ILLNESS: ICD-10-CM

## 2023-01-04 DIAGNOSIS — R05.1 ACUTE COUGH: Primary | ICD-10-CM

## 2023-01-04 DIAGNOSIS — R07.9 CHEST PAIN, UNSPECIFIED TYPE: ICD-10-CM

## 2023-01-04 LAB
FLUAV AG SPEC QL IA: NEGATIVE
FLUBV AG SPEC QL IA: NEGATIVE

## 2023-01-04 PROCEDURE — U0003 INFECTIOUS AGENT DETECTION BY NUCLEIC ACID (DNA OR RNA); SEVERE ACUTE RESPIRATORY SYNDROME CORONAVIRUS 2 (SARS-COV-2) (CORONAVIRUS DISEASE [COVID-19]), AMPLIFIED PROBE TECHNIQUE, MAKING USE OF HIGH THROUGHPUT TECHNOLOGIES AS DESCRIBED BY CMS-2020-01-R: HCPCS | Performed by: PHYSICIAN ASSISTANT

## 2023-01-04 PROCEDURE — 71046 X-RAY EXAM CHEST 2 VIEWS: CPT | Mod: TC | Performed by: RADIOLOGY

## 2023-01-04 PROCEDURE — 87804 INFLUENZA ASSAY W/OPTIC: CPT | Performed by: PHYSICIAN ASSISTANT

## 2023-01-04 PROCEDURE — 99214 OFFICE O/P EST MOD 30 MIN: CPT | Performed by: PHYSICIAN ASSISTANT

## 2023-01-04 PROCEDURE — U0005 INFEC AGEN DETEC AMPLI PROBE: HCPCS | Performed by: PHYSICIAN ASSISTANT

## 2023-01-04 RX ORDER — BENZONATATE 100 MG/1
CAPSULE ORAL
Qty: 45 CAPSULE | Refills: 0 | Status: SHIPPED | OUTPATIENT
Start: 2023-01-04 | End: 2024-04-15

## 2023-01-04 RX ORDER — OSELTAMIVIR PHOSPHATE 75 MG/1
75 CAPSULE ORAL 2 TIMES DAILY
Qty: 10 CAPSULE | Refills: 0 | Status: SHIPPED | OUTPATIENT
Start: 2023-01-04 | End: 2023-01-05

## 2023-01-05 ENCOUNTER — TELEPHONE (OUTPATIENT)
Dept: NURSING | Facility: CLINIC | Age: 43
End: 2023-01-05

## 2023-01-05 ENCOUNTER — VIRTUAL VISIT (OUTPATIENT)
Dept: URGENT CARE | Facility: CLINIC | Age: 43
End: 2023-01-05
Payer: COMMERCIAL

## 2023-01-05 DIAGNOSIS — U07.1 INFECTION DUE TO 2019 NOVEL CORONAVIRUS: Primary | ICD-10-CM

## 2023-01-05 LAB — SARS-COV-2 RNA RESP QL NAA+PROBE: POSITIVE

## 2023-01-05 PROCEDURE — 99213 OFFICE O/P EST LOW 20 MIN: CPT | Mod: CS

## 2023-01-05 NOTE — TELEPHONE ENCOUNTER
Coronavirus (COVID-19) Notification    Caller Name (Patient, parent, daughter/son, grandparent, etc)  Patient    Reason for call  Notify of Positive Coronavirus (COVID-19) lab results, assess symptoms,  review [facility] recommendations    Lab Result    Lab test:  2019-nCoV rRt-PCR or SARS-CoV-2 PCR    Oropharyngeal AND/OR nasopharyngeal swabs is POSITIVE for 2019-nCoV RNA/SARS-COV-2 PCR (COVID-19 virus)      Gather patient reported symptoms   Assessment   Current Symptoms at time of phone call, reported by patient: (if no symptoms, document: No symptoms] Headache, cough   Date of symptom(s) onset (if applicable) 1/3     If at time of call, Patients symptoms have worsened, the Patient should contact 911 or have someone drive them to Emergency Dept promptly:      If Patient calling 911, inform 911 personal that you have tested positive for the Coronavirus (COVID-19).  Place mask on and await 911 to arrive.    If Emergency Dept, If possible, please have another adult drive you to the Emergency Dept but you need to wear mask when in contact with other people.      Treatment Options:   Patient classified as COVID treatment eligible by Epic high risk algorithm: Yes  Is the patient symptomatic at the time of result notification? Yes. Was the onset of symptoms within the last 5 days? Yes.   There are now oral medications available for the treatment of COVID-19.  Taking one of these medications within the first five days of symptoms (when people may not yet feel severely ill) has been shown to make people feel better, prevent them from getting sicker, and preventing hospitalization and death.   Does the patient agree to have a visit with a provider to discuss treatment options? Yes. Is the patient seen at [facility]?  No: Warm transfer caller to [phone] to be scheduled with a virtual urgent provider.  During transfer, instruct  on appropriate time frame for visit     Review information with  Patient    Your result was positive. This means you have COVID-19 (coronavirus).    How can I protect others?    These guidelines are for isolating before returning to work, school or .    If you DO have symptoms    Stay home and away from others     For at least 5 days after your symptoms started, AND    You are fever free for 24 hours (with no medicine that reduces fever), AND    Your other symptoms are better    Wear a mask for 10 full days anytime you are around others    If you DON'T have symptoms    Stay home and away from others for at least 5 days after your positive test    Wear a mask for 10 full days anytime you are around others    There may be different guidelines for healthcare facilities.  Please check with the specific sites before arriving.    If you have been told by a doctor that you were severely ill with COVID-19 or are immunocompromised, you should isolate for at least 10 days.    You should not go back to work until you meet the guidelines above for ending your home isolation. You don't need to be retested for COVID-19 before going back to work--studies show that you won't spread the virus if it's been at least 10 days since your symptoms started (or 20 days, if you have a weak immune system).    Employers, schools, and daycares: This is an official notice for this person's medical guidelines for returning in-person.  They must meet the above guidelines before going back to work, school or  in person.    You will receive a positive COVID-19 letter via [employer] or the mail soon with additional self-care information.    Would you like me to review some of that information with you now?  No    If you were tested for an upcoming procedure, please contact your provider for next steps.    Ernestina Borden

## 2023-01-05 NOTE — PROGRESS NOTES
Chief Complaint   Patient presents with     Generalized Body Aches     Body pain, pain in front and back of chest. Onset- Tuesday      Cough       ASSESSMENT/PLAN:  Stephanie was seen today for generalized body aches and cough.    Diagnoses and all orders for this visit:    Acute cough  -     Influenza A & B Antigen - Clinic Collect  -     Symptomatic COVID-19 Virus (Coronavirus) by PCR Nose  -     benzonatate (TESSALON) 100 MG capsule; Take 1-2 capsules by mouth up to 3 x a day as needed with food    Chest pain, unspecified type  -     XR Chest 2 Views; Future    Influenza-like illness  -     benzonatate (TESSALON) 100 MG capsule; Take 1-2 capsules by mouth up to 3 x a day as needed with food  -     oseltamivir (TAMIFLU) 75 MG capsule; Take 1 capsule (75 mg) by mouth 2 times daily for 5 days    Differential diagnosis includes influenza, COVID-19, pneumonia, other viral URI  Symptomology and presentation most consistent with COVID-19 or influenza.  Recommend starting Tamiflu as I think flu is the most likely cause even though her test was negative.  If COVID positive stop Tamiflu and follow-up appointment for Paxlovid if she desires treatment.    Symptomatic and supportive care as outlined in AVS    Shailesh Rosario PA-C      SUBJECTIVE:  Stephanie is a 42 year old female who presents to urgent care with sudden onset of myalgias, chest pain, back pain, cough, mild sore throat, nasal congestion.  Today is day 2 and much worse than yesterday.  No significant shortness of breath.    ROS: Pertinent ROS neg other than the symptoms noted above in the HPI.     OBJECTIVE:  BP (!) 153/83 (BP Location: Left arm, Patient Position: Sitting, Cuff Size: Adult Regular)   Pulse 76   Temp 99.8  F (37.7  C) (Tympanic)   Wt 58.2 kg (128 lb 3.2 oz)   SpO2 100%   BMI 27.99 kg/m     GENERAL: Tired, shivering at times, alert and no distress  EYES: Eyes grossly normal to inspection, PERRL and conjunctivae and sclerae normal  HENT: ear canals  and TM's normal, nose and mouth without ulcers or lesions, no significant oropharynx erythema  RESP: lungs clear to auscultation - no rales, rhonchi or wheezes  CV: regular rate and rhythm, normal S1 S2, no S3 or S4, no murmur, click or rub, no peripheral edema and peripheral pulses strong  MS: no gross musculoskeletal defects noted, no edema, no chest tenderness  SKIN: no suspicious lesions or rashes    DIAGNOSTICS    Results for orders placed or performed in visit on 01/04/23   Influenza A & B Antigen - Clinic Collect     Status: Normal    Specimen: Nose; Swab   Result Value Ref Range    Influenza A antigen Negative Negative    Influenza B antigen Negative Negative    Narrative    Test results must be correlated with clinical data. If necessary, results should be confirmed by a molecular assay or viral culture.        No current outpatient medications on file.     No current facility-administered medications for this visit.      Patient Active Problem List   Diagnosis     Lumbar discitis     Chronic bilateral low back pain without sciatica     Tendonitis of wrist, left      No past medical history on file.  No past surgical history on file.  Family History   Problem Relation Age of Onset     Diabetes Mother      Social History     Tobacco Use     Smoking status: Never     Smokeless tobacco: Never   Substance Use Topics     Alcohol use: Yes              The plan of care was discussed with the patient. They understand and agree with the course of treatment prescribed. A printed summary was given including instructions and medications.  The use of Dragon/[employer] dictation services may have been used to construct the content in this note; any grammatical or spelling errors are non-intentional. Please contact the author of this note directly if you are in need of any clarification.

## 2023-01-05 NOTE — PATIENT INSTRUCTIONS
Symptoms began January 3  Stay home and away from others through January 8  You may be around others wearing a well fitting mask on January 9- January 13  Your isolation restrictions are over on January 14 as long as your symptoms are improving and you have been fever free for 24 hours, even if you still test positive for COVID.  However, if you test negative twice, at least 48 hours apart between days 6-10, you can stop wearing your mask earlier    Visit the CDC websites for more information and most up to date guidelines:  www.cdc.gov/coronavirus/2019-ncov/your-health/isolation.html  www.cdc.gov/coronavirus/2019-ncov/hcp/duration-isolation.html

## 2023-01-05 NOTE — PROGRESS NOTES
"Assessment & Plan     Infection due to 2019 novel coronavirus  - nirmatrelvir and ritonavir (PAXLOVID) therapy pack; Take 3 tablets by mouth 2 times daily for 5 days    Stop Tamiflu, start Paxlovid.  Continue Tessalon (benzonotate) for cough and Tylenol as needed for pain. Discussed isolationguidelines.     Return in about 1 week (around 1/12/2023) for visit with primary care provider if not improving.   COVID-19 positive patient.  Encounter for consideration of medication intervention. Patient does qualify for a prescription. Full discussion with patient including medication options, risks and benefits. Potential drug interactions reviewed with patient.     Treatment Planned: Paxlovid  Temporary change to home medications: none    Estimated body mass index is 27.99 kg/m  as calculated from the following:    Height as of 3/15/22: 1.441 m (4' 8.75\").    Weight as of 1/4/23: 58.2 kg (128 lb 3.2 oz).  GFR Estimate   Date Value Ref Range Status   03/15/2022 >90 >60 mL/min/1.73m2 Final     Comment:     Effective December 21, 2021 eGFRcr in adults is calculated using the 2021 CKD-EPI creatinine equation which includes age and gender (Nicolás et al., NEJM, DOI: 10.1056/VIYWdj5291790)     Lab Results   Component Value Date    [id number] Positive (A) 01/04/2023       ADAIR Devi [facility]    Subjective   Stephanie Goodman is a 42 year old who presents for the following health issues    HPI    Stephanie presents via telephone, with an , after testing positive for COVID-19.  She has had a cough, body aches, sore throat and nasal congestion that began 2 days ago January 3.  She was seen in clinic yesterday and her COVID test resulted positive.  She was started on Tamiflu, however her influenza test returned negative.  She is taking tessalon perles and Tylenol for control of her symptoms.    Review of Systems   ROS negative except as stated above.      Objective    Physical Exam "   healthy, alert and no distress  PSYCH: Alert and oriented times 3; coherent speech, normal   rate and volume, able to articulate logical thoughts, able   to abstract reason, no tangential thoughts, no hallucinations   or delusions. Affect is normal and pleasant  RESP: intermittent cough, no audible wheezing, able to talk in full sentences  Remainder of exam unable to be completed due to telephone visits    Call duration: 15 min  Provider location: offsite at home, [facility]

## 2023-01-05 NOTE — PATIENT INSTRUCTIONS
I think you either have influenza or COVID.  Your influenza test was negative but it seems very consistent with influenza.  Therefore I think we should treat you with something called Tamiflu which may shorten your symptoms    If your COVID test is positive you can set up a virtual urgent care or telephone visit to discuss medications that may benefit and treat COVID-19 specifically such as Paxlovid    You do not need to do anything besides rest and hydrate and your body will get over this.  Sometimes it can take up to 2 weeks to do so.  And the symptoms can be very annoying.    People are commonly contagious for about 5 days.  Wearing a mask will significantly reduce your risk of transmitting this to someone else if you are within that timeframe.    Some things that she can do to help with her symptoms include:    Pain, malaise and inflammation:  Ibuprofen and Tylenol for pain and inflammation.  I prefer ibuprofen  Ibuprofen 400-600 mg (2-3 of the 200 mg OTC tablets or 400-600 mg of the children's liquid) up to 4 times daily with food or milk  Tylenol 500-1000 mg every 8 hours as needed    For nasal congestion and drainage  Flonase/fluticasone nasal spray 2 sprays in each nostril once a day for 1 - 4 weeks.  This may take several days to become effective.  Consider saline nasal rinses  Psuedofed can help if you tolerate it but do not take if you have high blood pressure     Cough:  Mucinex or guaifenesin can help get mucus out of your body and help with cough.  Dextromethorphan is a cough suppressant that may be helpful  Tessalon Perles may be prescribed    Ear fullness or pain:  Flonase as above  Ibuprofen as above  Otovent, which is a balloon you blow up with your nose and helps pop the ears and regulate the pressure can be bought off of [employer] and works quite well    Be sure to eat nutrient dense foods with a good mixture of fats, carbohydrates and proteins.  Your body burns more calories while sick.

## 2023-02-07 ENCOUNTER — OFFICE VISIT (OUTPATIENT)
Dept: URGENT CARE | Facility: URGENT CARE | Age: 43
End: 2023-02-07
Payer: COMMERCIAL

## 2023-02-07 VITALS
WEIGHT: 124.1 LBS | OXYGEN SATURATION: 98 % | DIASTOLIC BLOOD PRESSURE: 89 MMHG | HEART RATE: 62 BPM | BODY MASS INDEX: 27.09 KG/M2 | TEMPERATURE: 97 F | SYSTOLIC BLOOD PRESSURE: 146 MMHG

## 2023-02-07 DIAGNOSIS — R05.1 ACUTE COUGH: Primary | ICD-10-CM

## 2023-02-07 PROCEDURE — 99213 OFFICE O/P EST LOW 20 MIN: CPT

## 2023-02-07 RX ORDER — DEXTROMETHORPHAN POLISTIREX 30 MG/5ML
60 SUSPENSION ORAL 2 TIMES DAILY
Qty: 148 ML | Refills: 0 | Status: SHIPPED | OUTPATIENT
Start: 2023-02-07 | End: 2024-04-15

## 2023-02-07 NOTE — PROGRESS NOTES
ASSESSMENT:  (R05.1) Acute cough  (primary encounter diagnosis)  Plan: dextromethorphan (DELSYM) 30 MG/5ML liquid    PLAN:  Informed the patient to take the cough medicine as prescribed.  We discussed trying Delsym as she indicated she did not have good symptomatic relief with her cough from her COVID infection last month with benzonatate.  Instructed her to get plenty of rest, drink fluids and use Tylenol and or ibuprofen as needed for pain and fever with a maximum dose Tylenol being 4000 mg in a 24-hour period of time and to take ibuprofen with food to avoid upset stomach.  We also discussed trying warm salt water gargles, hot/warm water or tea with honey and/or lemon and/or Cepacol lozenges or spray for her sore throat.  Discussed the need to return to clinic with any new or worsening symptoms.  Patient acknowledged her understanding of the above plan.    SIGRID Barnes CNP      SUBJECTIVE:   Stephanie Goodman is a 42 year old female presenting with a chief complaint of cough - non-productive, headache and chest congestion.  Onset of symptoms was 1 week ago.  Course of illness is worsening.    Patient denies: fever, vomiting and diarrhea  Treatment measures tried include Tylenol/Ibuprofen.  Predisposing factors include None.    ROS:  Negative except noted above.     OBJECTIVE:  There were no vitals taken for this visit.  GENERAL APPEARANCE: healthy, alert and no distress  EYES: EOMI,  PERRL, conjunctiva clear  HENT: ear canals and TM's normal.  Nose and mouth without ulcers, erythema or lesions  NECK: supple, nontender, no lymphadenopathy  RESP: lungs clear to auscultation - no rales, rhonchi or wheezes  CV: regular rates and rhythm, normal S1 S2, no murmur noted  SKIN: no suspicious lesions or rashes

## 2023-02-07 NOTE — PATIENT INSTRUCTIONS
Take the cough medicine as prescribed.  Get plenty of rest and drink fluids.  Can use Tylenol and/or ibuprofen as needed for pain and fever.  Maximum dose of Tylenol is 4000mg in a 24 hour period of time.  Take ibuprofen with food to avoid stomach upset.  You can also try warm salt water gargles, hot/warm water or tea with honey and/or lemon and/or Cepacol lozenges or spray for your sore throat.

## 2023-03-20 ENCOUNTER — OFFICE VISIT (OUTPATIENT)
Dept: FAMILY MEDICINE | Facility: CLINIC | Age: 43
End: 2023-03-20
Payer: COMMERCIAL

## 2023-03-20 VITALS
HEART RATE: 59 BPM | DIASTOLIC BLOOD PRESSURE: 73 MMHG | OXYGEN SATURATION: 100 % | SYSTOLIC BLOOD PRESSURE: 118 MMHG | WEIGHT: 126 LBS | HEIGHT: 57 IN | RESPIRATION RATE: 14 BRPM | TEMPERATURE: 97.3 F | BODY MASS INDEX: 27.18 KG/M2

## 2023-03-20 DIAGNOSIS — Z00.00 ROUTINE GENERAL MEDICAL EXAMINATION AT A HEALTH CARE FACILITY: Primary | ICD-10-CM

## 2023-03-20 DIAGNOSIS — R07.0 THROAT PAIN: ICD-10-CM

## 2023-03-20 DIAGNOSIS — K21.00 GASTROESOPHAGEAL REFLUX DISEASE WITH ESOPHAGITIS WITHOUT HEMORRHAGE: ICD-10-CM

## 2023-03-20 LAB
CHOLEST SERPL-MCNC: 194 MG/DL
FASTING STATUS PATIENT QL REPORTED: YES
FASTING STATUS PATIENT QL REPORTED: YES
GLUCOSE BLD-MCNC: 91 MG/DL (ref 70–99)
HDLC SERPL-MCNC: 50 MG/DL
HGB BLD-MCNC: 11.6 G/DL (ref 11.7–15.7)
LDLC SERPL CALC-MCNC: 112 MG/DL
NONHDLC SERPL-MCNC: 144 MG/DL
TRIGL SERPL-MCNC: 160 MG/DL

## 2023-03-20 PROCEDURE — 85018 HEMOGLOBIN: CPT | Performed by: PHYSICIAN ASSISTANT

## 2023-03-20 PROCEDURE — 91312 COVID-19 VACCINE BIVALENT BOOSTER 12+ (PFIZER): CPT | Performed by: PHYSICIAN ASSISTANT

## 2023-03-20 PROCEDURE — 90746 HEPB VACCINE 3 DOSE ADULT IM: CPT | Performed by: PHYSICIAN ASSISTANT

## 2023-03-20 PROCEDURE — 99396 PREV VISIT EST AGE 40-64: CPT | Mod: 25 | Performed by: PHYSICIAN ASSISTANT

## 2023-03-20 PROCEDURE — 99213 OFFICE O/P EST LOW 20 MIN: CPT | Mod: 25 | Performed by: PHYSICIAN ASSISTANT

## 2023-03-20 PROCEDURE — 0124A COVID-19 VACCINE BIVALENT BOOSTER 12+ (PFIZER): CPT | Performed by: PHYSICIAN ASSISTANT

## 2023-03-20 PROCEDURE — 90471 IMMUNIZATION ADMIN: CPT | Performed by: PHYSICIAN ASSISTANT

## 2023-03-20 PROCEDURE — 82947 ASSAY GLUCOSE BLOOD QUANT: CPT | Performed by: PHYSICIAN ASSISTANT

## 2023-03-20 PROCEDURE — 80061 LIPID PANEL: CPT | Performed by: PHYSICIAN ASSISTANT

## 2023-03-20 PROCEDURE — 36415 COLL VENOUS BLD VENIPUNCTURE: CPT | Performed by: PHYSICIAN ASSISTANT

## 2023-03-20 RX ORDER — PREDNISONE 20 MG/1
20 TABLET ORAL DAILY
Qty: 3 TABLET | Refills: 0 | Status: SHIPPED | OUTPATIENT
Start: 2023-03-20 | End: 2023-03-23

## 2023-03-20 ASSESSMENT — ENCOUNTER SYMPTOMS
BREAST MASS: 0
HEADACHES: 1

## 2023-03-20 ASSESSMENT — PAIN SCALES - GENERAL: PAINLEVEL: NO PAIN (0)

## 2023-03-20 NOTE — LETTER
April 19, 2023      Stephanie Goodman  [address] [place] [address]        Dear Ms.Chiqui Goodman,    We are writing to inform you of your test results.    Cholesterol is stable, sugar is within normal limits. Hemoglobin is within normal limits-small fluctuation is not worrisome.    Resulted Orders   Lipid Profile (Chol, Trig, HDL, LDL calc)   Result Value Ref Range    Cholesterol 194 <200 mg/dL    Triglycerides 160 (H) <150 mg/dL    Direct Measure HDL 50 >=50 mg/dL    LDL Cholesterol Calculated 112 (H) <=100 mg/dL    Non HDL Cholesterol 144 (H) <130 mg/dL    Patient Fasting > 8hrs? Yes     Narrative    Cholesterol  Desirable:  <200 mg/dL    Triglycerides  Normal:  Less than 150 mg/dL  Borderline High:  150-199 mg/dL  High:  200-499 mg/dL  Very High:  Greater than or equal to 500 mg/dL    Direct Measure HDL  Female:  Greater than or equal to 50 mg/dL   Male:  Greater than or equal to 40 mg/dL    LDL Cholesterol  Desirable:  <100mg/dL  Above Desirable:  100-129 mg/dL   Borderline High:  130-159 mg/dL   High:  160-189 mg/dL   Very High:  >= 190 mg/dL    Non HDL Cholesterol  Desirable:  130 mg/dL  Above Desirable:  130-159 mg/dL  Borderline High:  160-189 mg/dL  High:  190-219 mg/dL  Very High:  Greater than or equal to 220 mg/dL   Glucose   Result Value Ref Range    Glucose 91 70 - 99 mg/dL    Patient Fasting > 8hrs? Yes    Hemoglobin   Result Value Ref Range    Hemoglobin 11.6 (L) 11.7 - 15.7 g/dL       If you have any questions or concerns, please call the clinic at the number listed above.       Sincerely,      Steph Dodge PA-C

## 2023-03-20 NOTE — NURSING NOTE
Prior to immunization administration, verified patients identity using patient s name and date of birth. Please see Immunization Activity for additional information.     Screening Questionnaire for Adult Immunization    Are you sick today?   No   Do you have allergies to medications, food, a vaccine component or latex?   No   Have you ever had a serious reaction after receiving a vaccination?   No   Do you have a long-term health problem with heart, lung, kidney, or metabolic disease (e.g., diabetes), asthma, a blood disorder, no spleen, complement component deficiency, a cochlear implant, or a spinal fluid leak?  Are you on long-term aspirin therapy?   No   Do you have cancer, leukemia, HIV/AIDS, or any other immune system problem?   No   Do you have a parent, brother, or sister with an immune system problem?   No   In the past 3 months, have you taken medications that affect  your immune system, such as prednisone, other steroids, or anticancer drugs; drugs for the treatment of rheumatoid arthritis, Crohn s disease, or psoriasis; or have you had radiation treatments?   No   Have you had a seizure, or a brain or other nervous system problem?   No   During the past year, have you received a transfusion of blood or blood    products, or been given immune (gamma) globulin or antiviral drug?   No   For women: Are you pregnant or is there a chance you could become       pregnant during the next month?   No   Have you received any vaccinations in the past 4 weeks?   No     Immunization questionnaire answers were all negative.        Per orders of Dr. Dodge, injection of hep B and bivalent booster given by Helena Arthur MA. Patient instructed to remain in clinic for 15 minutes afterwards, and to report any adverse reaction to me immediately.       Screening performed by Helena Arthur MA on 3/20/2023 at 9:37 AM.

## 2023-03-20 NOTE — NURSING NOTE
"Prior to immunization administration, verified patients identity using patient s name and date of birth. Please see Immunization Activity for additional information.     Screening Questionnaire for Adult Immunization    Are you sick today?   { :257118::\"No\"}   Do you have allergies to medications, food, a vaccine component or latex?   { :715740::\"No\"}   Have you ever had a serious reaction after receiving a vaccination?   { :835626::\"No\"}   Do you have a long-term health problem with heart, lung, kidney, or metabolic disease (e.g., diabetes), asthma, a blood disorder, no spleen, complement component deficiency, a cochlear implant, or a spinal fluid leak?  Are you on long-term aspirin therapy?   { :221141::\"No\"}   Do you have cancer, leukemia, HIV/AIDS, or any other immune system problem?   { :647330::\"No\"}   Do you have a parent, brother, or sister with an immune system problem?   { :832868::\"No\"}   In the past 3 months, have you taken medications that affect  your immune system, such as prednisone, other steroids, or anticancer drugs; drugs for the treatment of rheumatoid arthritis, Crohn s disease, or psoriasis; or have you had radiation treatments?   { :101688::\"No\"}   Have you had a seizure, or a brain or other nervous system problem?   { :296307::\"No\"}   During the past year, have you received a transfusion of blood or blood    products, or been given immune (gamma) globulin or antiviral drug?   { :255772::\"No\"}   For women: Are you pregnant or is there a chance you could become       pregnant during the next month?   { :670162::\"No\"}   Have you received any vaccinations in the past 4 weeks?   { :001753::\"No\"}     Immunization questionnaire { :550828::\"answers were all negative.\"}        Per orders of Dr. Dodge, injection of bivalent booster, hep B given by Helena Arthur MA. Patient instructed to remain in clinic for 15 minutes afterwards, and to report any adverse reaction to me immediately.     "   Screening performed by Helena Arthur MA on 3/20/2023 at 9:28 AM.

## 2023-03-20 NOTE — PROGRESS NOTES
SUBJECTIVE:   CC: Stephanie is an 42 year old who presents for preventive health visit.   Patient has been advised of split billing requirements and indicates understanding: Yes  Healthy Habits:     Getting at least 3 servings of Calcium per day:  Yes    Bi-annual eye exam:  Yes    Dental care twice a year:  Yes    Sleep apnea or symptoms of sleep apnea:  None    Diet:  Regular (no restrictions)    Frequency of exercise:  2-3 days/week    Duration of exercise:  15-30 minutes    Taking medications regularly:  Yes    Medication side effects:  None    PHQ-2 Total Score: 0    Additional concerns today:  No      Gastrointestinal symptoms      Duration: chronic     Description:           REFLUX SYMPTOMS - heartburn, regurgitation of food and acid taste in mouth      Intensity:  moderate    Accompanying signs and symptoms:  none    History  Previous {similar problem: YES  Previous evaluation:  none    Aggravating factors: meals- cheese, tomatoes, pasta sauce    Alleviating factors: nothing    Other Therapies tried: None    Concern:    Patient reports that after she had Covid in January 2023, she can't get rid of dry and irritated throat int he morning. Denies sinus pressure, PND or cough. Denies new illness.     Today's PHQ-2 Score:   PHQ-2 ( 1999 Pfizer) 3/20/2023   Q1: Little interest or pleasure in doing things 0   Q2: Feeling down, depressed or hopeless 0   PHQ-2 Score 0   PHQ-2 Total Score (12-17 Years)- Positive if 3 or more points; Administer PHQ-A if positive -   Q1: Little interest or pleasure in doing things Not at all   Q2: Feeling down, depressed or hopeless Not at all   PHQ-2 Score 0           Social History     Tobacco Use     Smoking status: Never     Smokeless tobacco: Never   Substance Use Topics     Alcohol use: Yes         Alcohol Use 3/20/2023   Prescreen: >3 drinks/day or >7 drinks/week? No       Reviewed orders with patient.  Reviewed health maintenance and updated orders accordingly - Yes  Patient  Active Problem List   Diagnosis     Lumbar discitis     Chronic bilateral low back pain without sciatica     Tendonitis of wrist, left     History reviewed. No pertinent surgical history.    Social History     Tobacco Use     Smoking status: Never     Smokeless tobacco: Never   Substance Use Topics     Alcohol use: Yes     Family History   Problem Relation Age of Onset     Diabetes Mother          Current Outpatient Medications   Medication Sig Dispense Refill     omeprazole (PRILOSEC) 20 MG DR capsule Take 1 capsule (20 mg) by mouth daily 90 capsule 1     predniSONE (DELTASONE) 20 MG tablet Take 1 tablet (20 mg) by mouth daily for 3 days 3 tablet 0     benzonatate (TESSALON) 100 MG capsule Take 1-2 capsules by mouth up to 3 x a day as needed with food (Patient not taking: Reported on 2/7/2023) 45 capsule 0     dextromethorphan (DELSYM) 30 MG/5ML liquid Take 10 mLs (60 mg) by mouth 2 times daily 148 mL 0     Allergies   Allergen Reactions     No Clinical Screening - See Comments        Breast Cancer Screening:    Breast CA Risk Assessment (FHS-7) 3/15/2022   Do you have a family history of breast, colon, or ovarian cancer? No / Unknown         Mammogram Screening - Offered annual screening and updated Health Maintenance for mutual plan based on risk factor consideration    Pertinent mammograms are reviewed under the imaging tab.    History of abnormal Pap smear: NO - age 30-65 PAP every 5 years with negative HPV co-testing recommended  PAP / HPV Latest Ref Rng & Units 12/3/2018   PAP (Historical) - NIL   HPV16 NEG:Negative Negative   HPV18 NEG:Negative Negative   HRHPV NEG:Negative Negative     Reviewed and updated as needed this visit by clinical staff   Tobacco  Allergies  Meds  Problems  Med Hx  Surg Hx  Fam Hx          Reviewed and updated as needed this visit by Provider   Tobacco  Allergies  Meds  Problems  Med Hx  Surg Hx  Fam Hx             Review of Systems   Breasts:  Negative for tenderness,  "breast mass and discharge.   Genitourinary: Negative for pelvic pain, vaginal bleeding and vaginal discharge.   Neurological: Positive for headaches.     CONSTITUTIONAL: NEGATIVE for fever, chills, change in weight  INTEGUMENTARU/SKIN: NEGATIVE for worrisome rashes, moles or lesions  EYES: NEGATIVE for vision changes or irritation  ENT: NEGATIVE for ear, mouth and throat problems  RESP: NEGATIVE for significant cough or SOB  BREAST: NEGATIVE for masses, tenderness or discharge  CV: NEGATIVE for chest pain, palpitations or peripheral edema  GI: NEGATIVE for nausea, abdominal pain, heartburn, or change in bowel habits  : NEGATIVE for unusual urinary or vaginal symptoms. Periods are regular.  MUSCULOSKELETAL: NEGATIVE for significant arthralgias or myalgia  NEURO: NEGATIVE for weakness, dizziness or paresthesias  PSYCHIATRIC: NEGATIVE for changes in mood or affect     OBJECTIVE:   /73 (BP Location: Left arm, Patient Position: Chair, Cuff Size: Adult Regular)   Pulse 59   Temp 97.3  F (36.3  C) (Tympanic)   Resp 14   Ht 1.443 m (4' 8.81\")   Wt 57.2 kg (126 lb)   LMP 02/28/2023   SpO2 100%   BMI 27.45 kg/m    Physical Exam  GENERAL: healthy, alert and no distress  EYES: Eyes grossly normal to inspection, PERRL and conjunctivae and sclerae normal  HENT: ear canals and TM's normal, nose and mouth without ulcers or lesions  NECK: no adenopathy, no asymmetry, masses, or scars and thyroid normal to palpation  RESP: lungs clear to auscultation - no rales, rhonchi or wheezes  BREAST: normal without masses, tenderness or nipple discharge and no palpable axillary masses or adenopathy  CV: regular rate and rhythm, normal S1 S2, no S3 or S4, no murmur, click or rub, no peripheral edema and peripheral pulses strong  ABDOMEN: soft, nontender, no hepatosplenomegaly, no masses and bowel sounds normal  MS: no gross musculoskeletal defects noted, no edema  SKIN: no suspicious lesions or rashes  NEURO: Normal strength and " tone, mentation intact and speech normal  PSYCH: mentation appears normal, affect normal/bright    Diagnostic Test Results:  Labs reviewed in Epic    ASSESSMENT/PLAN:   Stephanie was seen today for physical.    Diagnoses and all orders for this visit:    Routine general medical examination at a health care facility  -     Lipid Profile (Chol, Trig, HDL, LDL calc); Future  -     Glucose; Future  -     Hemoglobin; Future  -     Lipid Profile (Chol, Trig, HDL, LDL calc)  -     Glucose  -     Hemoglobin    Throat pain  -     predniSONE (DELTASONE) 20 MG tablet; Take 1 tablet (20 mg) by mouth daily for 3 days    Gastroesophageal reflux disease with esophagitis without hemorrhage  -     omeprazole (PRILOSEC) 20 MG DR capsule; Take 1 capsule (20 mg) by mouth daily    Other orders  -     REVIEW OF HEALTH MAINTENANCE PROTOCOL ORDERS  -     COVID-19 VACCINE BIVALENT BOOSTER 12+ (PFIZER)  -     HEPATITIS B VACCINE, ADULT, IM        Patient has been advised of split billing requirements and indicates understanding: Yes      COUNSELING:  Reviewed preventive health counseling, as reflected in patient instructions       Regular exercise       Healthy diet/nutrition       Immunizations    Vaccinated for: Covid-19 and Hepatitis B          Omeprazole - take on empty stomach every morning 30 minutes before eating for 1-3 months   Prednisone 20 mg daily after eating for 3 days for throat pain  Take vitamins after dinner     She reports that she has never smoked. She has never used smokeless tobacco.      ADAIR Garcia [facility]

## 2023-03-20 NOTE — PATIENT INSTRUCTIONS
Omeprazole - take on empty stomach every morning 30 minutes before eating for 1-3 months   Prednisone 20 mg daily after eating for 3 days for throat pain  Take vitamins after dinner       At [facility], we strive to deliver an exceptional experience to you, every time we see you. If you receive a survey, please complete it as we do value your feedback.  If you have MyChart, you can expect to receive results automatically within 24 hours of their completion.  Your provider will send a note interpreting your results as well.   If you do not have MyChart, you should receive your results in about a week by mail.    Your care team:                            Family Medicine Internal Medicine   MD Karel Gonzales MD Shantel Branch-Fleming, MD Srinivasa Vaka, MD Katya Belousova, PA-C Megan (HillSIGRID Jimenez CNP, MD Pediatrics   ADAIR Mercado MD Paula Brito, MD Amelia Massimini APRN SIGRID Franco CNP, MD Charanya Pasupathi, MD          Clinic hours: Monday - Thursday 7 am-6 pm; Fridays 7 am-5 pm.   Urgent care: Monday - Friday 10 am- 8 pm; Saturday and Sunday 9 am-5 pm.    Clinic: [phone]       [facility]: Monday - Thursday 8 am - 7 pm; Friday 8 am - 6 pm  [facility]: [phone]     Preventive Health Recommendations  Female Ages 40 to 49    Yearly exam:   See your health care provider every year in order to  Review health changes.   Discuss preventive care.    Review your medicines if your doctor prescribed any.    Get a Pap test every three years (unless you have an abnormal result and your provider advises testing more often).    If you get Pap tests with HPV test, you only need to test every 5 years, unless you have an abnormal result. You do not need a Pap test if your uterus was removed (hysterectomy) and you have not had  cancer.    You should be tested each year for STDs (sexually transmitted diseases), if you're at risk.   Ask your doctor if you should have a mammogram.    Have a colonoscopy (test for colon cancer) if someone in your family has had colon cancer or polyps before age 50.     Have a cholesterol test every 5 years.     Have a diabetes test (fasting glucose) after age 45. If you are at risk for diabetes, you should have this test every 3 years.    Shots: Get a flu shot each year. Get a tetanus shot every 10 years.     Nutrition:   Eat at least 5 servings of fruits and vegetables each day.  Eat whole-grain bread, whole-wheat pasta and brown rice instead of white grains and rice.  Get adequate Calcium and Vitamin D.      Lifestyle  Exercise at least 150 minutes a week (an average of 30 minutes a day, 5 days a week). This will help you control your weight and prevent disease.  Limit alcohol to one drink per day.  No smoking.   Wear sunscreen to prevent skin cancer.  See your dentist every six months for an exam and cleaning.

## 2023-05-01 ENCOUNTER — TRANSFERRED RECORDS (OUTPATIENT)
Dept: MULTI SPECIALTY CLINIC | Facility: CLINIC | Age: 43
End: 2023-05-01

## 2023-11-22 ENCOUNTER — OFFICE VISIT (OUTPATIENT)
Dept: URGENT CARE | Facility: URGENT CARE | Age: 43
End: 2023-11-22
Payer: COMMERCIAL

## 2023-11-22 VITALS
SYSTOLIC BLOOD PRESSURE: 128 MMHG | TEMPERATURE: 97.6 F | DIASTOLIC BLOOD PRESSURE: 84 MMHG | BODY MASS INDEX: 25.23 KG/M2 | OXYGEN SATURATION: 99 % | WEIGHT: 115.8 LBS | HEART RATE: 70 BPM | RESPIRATION RATE: 16 BRPM

## 2023-11-22 DIAGNOSIS — R05.1 ACUTE COUGH: ICD-10-CM

## 2023-11-22 DIAGNOSIS — R07.0 THROAT PAIN: ICD-10-CM

## 2023-11-22 DIAGNOSIS — J01.90 ACUTE SINUSITIS WITH SYMPTOMS > 10 DAYS: Primary | ICD-10-CM

## 2023-11-22 LAB
DEPRECATED S PYO AG THROAT QL EIA: NEGATIVE
GROUP A STREP BY PCR: NOT DETECTED
SARS-COV-2 RNA RESP QL NAA+PROBE: NEGATIVE

## 2023-11-22 PROCEDURE — 87635 SARS-COV-2 COVID-19 AMP PRB: CPT | Performed by: STUDENT IN AN ORGANIZED HEALTH CARE EDUCATION/TRAINING PROGRAM

## 2023-11-22 PROCEDURE — 99213 OFFICE O/P EST LOW 20 MIN: CPT | Performed by: STUDENT IN AN ORGANIZED HEALTH CARE EDUCATION/TRAINING PROGRAM

## 2023-11-22 PROCEDURE — 87651 STREP A DNA AMP PROBE: CPT | Performed by: STUDENT IN AN ORGANIZED HEALTH CARE EDUCATION/TRAINING PROGRAM

## 2023-11-22 RX ORDER — FLUTICASONE PROPIONATE 50 MCG
1 SPRAY, SUSPENSION (ML) NASAL DAILY
Qty: 11.1 ML | Refills: 0 | Status: SHIPPED | OUTPATIENT
Start: 2023-11-22 | End: 2023-12-14

## 2023-11-22 NOTE — PROGRESS NOTES
ASSESSMENT & PLAN:   Diagnoses and all orders for this visit:  Acute sinusitis with symptoms > 10 days  -     amoxicillin-clavulanate (AUGMENTIN) 875-125 MG tablet; Take 1 tablet by mouth 2 times daily for 7 days  -     fluticasone (FLONASE) 50 MCG/ACT nasal spray; Spray 1 spray into both nostrils daily  Throat pain  -     Streptococcus A Rapid Screen w/Reflex to PCR - Clinic Collect  -     Symptomatic COVID-19 Virus (Coronavirus) by PCR Nose  -     Group A Streptococcus PCR Throat Swab  Acute cough  -     Symptomatic COVID-19 Virus (Coronavirus) by PCR Nose    URI symptoms x 9 days. With duration of symptoms will treat with Augmentin x 7 days. Flonase to help with inflammation and sinus pain. RST negative, PCR pending. Covid test pending.     At the end of the encounter, I discussed results, diagnosis, medications. Discussed red flags for immediate return to clinic/ER, as well as indications for follow up if no improvement. Patient and/or caregiver understood and agreed to plan. Patient was stable for discharge.    There are no Patient Instructions on file for this visit.    ------------------------------------------------------------------------  SUBJECTIVE  History was obtained from patient.    Patient presents with:  Headache  Pharyngitis  Cough: Sx onset- Thursday (11/16)    SERGEI  Stephanie Goodman is a(n) 43 year old female presenting to urgent care for URI symptoms x 9 days. Endorses right-sided sinus pain, headache, rhinorrhea, sore throat, cough. Cough sometimes productive with mucus. No fever, shortness of breath, chest pain. No known sick contacts. No history of asthma or allergies.     Review of Systems    Current Outpatient Medications   Medication Sig Dispense Refill    amoxicillin-clavulanate (AUGMENTIN) 875-125 MG tablet Take 1 tablet by mouth 2 times daily for 7 days 14 tablet 0    fluticasone (FLONASE) 50 MCG/ACT nasal spray Spray 1 spray into both nostrils daily 11.1 mL 0    benzonatate (TESSALON)  100 MG capsule Take 1-2 capsules by mouth up to 3 x a day as needed with food (Patient not taking: Reported on 2/7/2023) 45 capsule 0    dextromethorphan (DELSYM) 30 MG/5ML liquid Take 10 mLs (60 mg) by mouth 2 times daily (Patient not taking: Reported on 11/22/2023) 148 mL 0    omeprazole (PRILOSEC) 20 MG DR capsule Take 1 capsule (20 mg) by mouth daily (Patient not taking: Reported on 11/22/2023) 90 capsule 1     Problem List:  2018-12: Tendonitis of wrist, left  2017-10: Chronic bilateral low back pain without sciatica  2017-10: Lumbar discitis  2013-04: Lumbago  2013-04: Pregnant state, incidental    Allergies   Allergen Reactions    No Clinical Screening - See Comments          OBJECTIVE  Vitals:    11/22/23 1031   BP: 128/84   BP Location: Left arm   Patient Position: Sitting   Cuff Size: Adult Regular   Pulse: 70   Resp: 16   Temp: 97.6  F (36.4  C)   TempSrc: Tympanic   SpO2: 99%   Weight: 52.5 kg (115 lb 12.8 oz)     Physical Exam   GENERAL: healthy, alert, no acute distress.   PSYCH: mentation appears normal. Normal affect  HEAD: normocephalic, atraumatic.  EYE: PERRL. EOMs intact. No scleral injection bilaterally.   EAR: external ear normal. Bilateral ear canals normal and nonpainful. Bilateral TM intact, pearly, translucent without bulging.  NOSE: external nose atraumatic without lesions. Nasal turbinates inflamed. Right-sided maxillary tenderness.   OROPHARYNX: moist mucous membranes. Posterior oropharynx without erythema or exudate. Uvula midline. Patent airway.  LUNGS: no increased work of breathing. Clear lung sounds bilaterally. No wheezing, rhonchi, or rales.   CV: regular rate and rhythm. No clicks, murmurs, or rubs.    Results for orders placed or performed in visit on 11/22/23   Streptococcus A Rapid Screen w/Reflex to PCR - Clinic Collect     Status: Normal    Specimen: Throat; Swab   Result Value Ref Range    Group A Strep antigen Negative Negative

## 2023-12-14 DIAGNOSIS — J01.90 ACUTE SINUSITIS WITH SYMPTOMS > 10 DAYS: ICD-10-CM

## 2023-12-14 RX ORDER — FLUTICASONE PROPIONATE 50 MCG
1 SPRAY, SUSPENSION (ML) NASAL DAILY
Qty: 48 ML | Refills: 0 | Status: SHIPPED | OUTPATIENT
Start: 2023-12-14 | End: 2024-04-15

## 2023-12-29 ENCOUNTER — TELEPHONE (OUTPATIENT)
Dept: FAMILY MEDICINE | Facility: CLINIC | Age: 43
End: 2023-12-29
Payer: COMMERCIAL

## 2023-12-29 NOTE — TELEPHONE ENCOUNTER
Patient Quality Outreach    Patient is due for the following:   Cervical Cancer Screening - PAP Needed      Topic Date Due    Hepatitis B Vaccine (3 of 3 - 19+ 3-dose series) 05/15/2023    Diptheria Tetanus Pertussis (DTAP/TDAP/TD) Vaccine (4 - Td or Tdap) 06/27/2023    Flu Vaccine (1) 09/01/2023    COVID-19 Vaccine (5 - 2023-24 season) 09/01/2023       Next Steps:   Schedule a office visit for items above    Type of outreach:    Sent letter.      Questions for provider review:    None           Dagmar Garcia MA

## 2023-12-29 NOTE — LETTER
December 29, 2023    Stephanie Goodman  [address]  [place] [address]    Dear Stephanie Goodman,     At [facility] we care about your health and are committed to providing quality patient care.    Which includes staying current on preventive cancer screenings.  You can increase your chances of finding and treating cancers through regular screenings.      Our records indicate you may be due for the following preventive screening(s):  Cervical Cancer Screening - PAP Needed      Topic Date Due    Hepatitis B Vaccine (3 of 3 - 19+ 3-dose series) 05/15/2023    Diptheria Tetanus Pertussis (DTAP/TDAP/TD) Vaccine (4 - Td or Tdap) 06/27/2023    Flu Vaccine (1) 09/01/2023    COVID-19 Vaccine (5 - 2023-24 season) 09/01/2023       To schedule an appointment or discuss this screening further, you may contact us by phone at the [facility] at [phone] or online through the patient portal/[email or web address] @ [email or web address]    If you have had any of the screenings listed above at another facility, please call us so that we may update your chart.      Your partners in health,      Quality Committee at [facility]

## 2024-01-18 ENCOUNTER — OFFICE VISIT (OUTPATIENT)
Dept: URGENT CARE | Facility: URGENT CARE | Age: 44
End: 2024-01-18
Payer: COMMERCIAL

## 2024-01-18 ENCOUNTER — ANCILLARY PROCEDURE (OUTPATIENT)
Dept: GENERAL RADIOLOGY | Facility: CLINIC | Age: 44
End: 2024-01-18
Attending: PHYSICIAN ASSISTANT
Payer: COMMERCIAL

## 2024-01-18 VITALS
SYSTOLIC BLOOD PRESSURE: 141 MMHG | DIASTOLIC BLOOD PRESSURE: 79 MMHG | TEMPERATURE: 97.5 F | RESPIRATION RATE: 24 BRPM | BODY MASS INDEX: 25.12 KG/M2 | WEIGHT: 115.3 LBS | OXYGEN SATURATION: 99 % | HEART RATE: 76 BPM

## 2024-01-18 DIAGNOSIS — R05.3 PERSISTENT COUGH FOR 3 WEEKS OR LONGER: ICD-10-CM

## 2024-01-18 DIAGNOSIS — J10.1 INFLUENZA A: Primary | ICD-10-CM

## 2024-01-18 LAB
FLUAV AG SPEC QL IA: POSITIVE
FLUBV AG SPEC QL IA: NEGATIVE

## 2024-01-18 PROCEDURE — 71046 X-RAY EXAM CHEST 2 VIEWS: CPT | Mod: TC | Performed by: STUDENT IN AN ORGANIZED HEALTH CARE EDUCATION/TRAINING PROGRAM

## 2024-01-18 PROCEDURE — 87804 INFLUENZA ASSAY W/OPTIC: CPT | Performed by: PHYSICIAN ASSISTANT

## 2024-01-18 PROCEDURE — 99215 OFFICE O/P EST HI 40 MIN: CPT | Performed by: PHYSICIAN ASSISTANT

## 2024-01-18 PROCEDURE — 87635 SARS-COV-2 COVID-19 AMP PRB: CPT | Performed by: PHYSICIAN ASSISTANT

## 2024-01-18 RX ORDER — OSELTAMIVIR PHOSPHATE 75 MG/1
75 CAPSULE ORAL 2 TIMES DAILY
Qty: 10 CAPSULE | Refills: 0 | Status: SHIPPED | OUTPATIENT
Start: 2024-01-18 | End: 2024-01-23

## 2024-01-18 ASSESSMENT — ENCOUNTER SYMPTOMS
MYALGIAS: 1
RESPIRATORY NEGATIVE: 1
DIARRHEA: 0
JOINT SWELLING: 0
ENDOCRINE NEGATIVE: 1
WEAKNESS: 0
NECK STIFFNESS: 0
BACK PAIN: 0
SORE THROAT: 1
EYES NEGATIVE: 1
ALLERGIC/IMMUNOLOGIC NEGATIVE: 1
COUGH: 0
CARDIOVASCULAR NEGATIVE: 1
WOUND: 0
FEVER: 1
EYE DISCHARGE: 0
CHILLS: 0
SHORTNESS OF BREATH: 0
EYE PAIN: 0
VOMITING: 0
NECK PAIN: 0
ARTHRALGIAS: 0
PALPITATIONS: 0
EYE REDNESS: 0
HEADACHES: 1
SINUS PRESSURE: 1
LIGHT-HEADEDNESS: 0
DIZZINESS: 0
RHINORRHEA: 1
NAUSEA: 0
EYE ITCHING: 0

## 2024-01-18 NOTE — PROGRESS NOTES
Chief Complaint:     Chief Complaint   Patient presents with    Pain     Chest pain from coughing since November    Headache     Come and go for 2 weeks, yesterday worsened - has taken Tylenol does not help much    Generalized Body Aches     Pt stated her bones ache for 3 days     Cough     Dry cough     Throat Pain     Pt has taken otc cough syrup does not help, also loss voice last week     Red Eye Left Eye     Noticed red eye week ago, come and go        Results for orders placed or performed in visit on 01/18/24   XR Chest 2 Views     Status: None (Preliminary result)    Narrative    CHEST TWO VIEWS  1/18/2024 10:33 AM     HISTORY: Persistent cough for 3 weeks or longer.    COMPARISON: Chest radiograph 1/4/2023.       Impression    IMPRESSION: No significant interval change. No focal consolidation,  pleural effusion or pneumothorax. Cardiomediastinal silhouette is  unremarkable.   Results for orders placed or performed in visit on 01/18/24   Influenza A & B Antigen - Clinic Collect     Status: Abnormal    Specimen: Nose; Swab   Result Value Ref Range    Influenza A antigen Positive (A) Negative    Influenza B antigen Negative Negative    Narrative    Test results must be correlated with clinical data. If necessary, results should be confirmed by a molecular assay or viral culture.       Medical Decision Making:    Vital signs reviewed by Carlos Brand PA-C  BP (!) 141/79   Pulse 76   Temp 97.5  F (36.4  C) (Tympanic)   Resp 24   Wt 52.3 kg (115 lb 4.8 oz)   SpO2 99%   BMI 25.12 kg/m      Differential Diagnosis:  URI Adult/Peds:  Acute left otitis media, Bronchitis-viral, COVID-19, Influenza, Tonsilitis, Viral pharyngitis, Viral syndrome, and Viral upper respiratory illness        ASSESSMENT    1. Influenza A    2. Persistent cough for 3 weeks or longer        PLAN    Patient is in no acute distress.    CXR was was negative for any acute infiltrates or consolidations per my read per my read.  Temp is  97.5 in clinic today, lung sounds were clear, and O2 sats at 99% on RA.    Influenza was positive for A.  Rx for Tamiflu sent in..  COVID swab collected in clinic today.  Rest, Push fluids, vaporizer, elevation of head of bed.  Ibuprofen and or Tylenol for any fever or body aches.  Over the counter cough suppressant- PRN- as discussed.   If symptoms worsen, recheck immediately otherwise follow up with your PCP in 1 week if symptoms are not improving.  Worrisome symptoms discussed with instructions to go to the ED.  Patient verbalized understanding and agreed with this plan.    44 minutes was spent in the care of this patient including chart review, HPI, ROS, PE, review of plan, and placing of orders.      Labs:    Results for orders placed or performed in visit on 01/18/24   XR Chest 2 Views     Status: None (Preliminary result)    Narrative    CHEST TWO VIEWS  1/18/2024 10:33 AM     HISTORY: Persistent cough for 3 weeks or longer.    COMPARISON: Chest radiograph 1/4/2023.       Impression    IMPRESSION: No significant interval change. No focal consolidation,  pleural effusion or pneumothorax. Cardiomediastinal silhouette is  unremarkable.   Results for orders placed or performed in visit on 01/18/24   Influenza A & B Antigen - Clinic Collect     Status: Abnormal    Specimen: Nose; Swab   Result Value Ref Range    Influenza A antigen Positive (A) Negative    Influenza B antigen Negative Negative    Narrative    Test results must be correlated with clinical data. If necessary, results should be confirmed by a molecular assay or viral culture.        Vital signs reviewed by Carlos Brand PA-C  BP (!) 141/79   Pulse 76   Temp 97.5  F (36.4  C) (Tympanic)   Resp 24   Wt 52.3 kg (115 lb 4.8 oz)   SpO2 99%   BMI 25.12 kg/m      Current Meds      Current Outpatient Medications:     fluticasone (FLONASE) 50 MCG/ACT nasal spray, INSTILL 1 SPRAY INTO BOTH NOSTRILS DAILY, Disp: 48 mL, Rfl: 0    oseltamivir (TAMIFLU)  "75 MG capsule, Take 1 capsule (75 mg) by mouth 2 times daily for 5 days, Disp: 10 capsule, Rfl: 0    benzonatate (TESSALON) 100 MG capsule, Take 1-2 capsules by mouth up to 3 x a day as needed with food (Patient not taking: Reported on 2/7/2023), Disp: 45 capsule, Rfl: 0    dextromethorphan (DELSYM) 30 MG/5ML liquid, Take 10 mLs (60 mg) by mouth 2 times daily (Patient not taking: Reported on 11/22/2023), Disp: 148 mL, Rfl: 0    omeprazole (PRILOSEC) 20 MG DR capsule, Take 1 capsule (20 mg) by mouth daily (Patient not taking: Reported on 11/22/2023), Disp: 90 capsule, Rfl: 1      Respiratory History    no history of pneumonia or bronchitis      SUBJECTIVE    HPI: Stephanie Goodman is an 43 year old female who presents with chest congestion, dry cough, left ear pain, fever, headache, nasal congestion, and sore throat.  Symptoms began more than 2 months ago. She was seen in our urgent care on 11/22/23 where augmentin and flonase were prescribed. Patient reports no improvement in her symptoms with these medications and states her symptoms have been waxing and waning since then. She notes her most recent fever was last night and feels her symptoms have been worsening the past 2 days. She does report that she has been using \"[place] from [place]\" for the past 3 weeks with no improvement. There is no shortness of breath and wheezing.  Patient is eating and drinking well.  No nausea, vomiting, or diarrhea.    Patient denies any recent travel or exposure to known COVID positive tested individual.      ROS:     Review of Systems   Constitutional:  Positive for fever. Negative for chills.   HENT:  Positive for congestion, ear pain, rhinorrhea, sinus pressure and sore throat.    Eyes: Negative.  Negative for pain, discharge, redness and itching.   Respiratory: Negative.  Negative for cough and shortness of breath.    Cardiovascular: Negative.  Negative for chest pain and palpitations.   Gastrointestinal:  Negative for " diarrhea, nausea and vomiting.   Endocrine: Negative.    Genitourinary: Negative.    Musculoskeletal:  Positive for myalgias. Negative for arthralgias, back pain, joint swelling, neck pain and neck stiffness.   Skin: Negative.  Negative for rash and wound.   Allergic/Immunologic: Negative.  Negative for immunocompromised state.   Neurological:  Positive for headaches. Negative for dizziness, weakness and light-headedness.         Family History   Family History   Problem Relation Age of Onset    Diabetes Mother         Problem history  Patient Active Problem List   Diagnosis    Lumbar discitis    Chronic bilateral low back pain without sciatica    Tendonitis of wrist, left        Allergies  Allergies   Allergen Reactions    No Clinical Screening - See Comments         Social History  Social History     Socioeconomic History    Marital status:      Spouse name: Not on file    Number of children: Not on file    Years of education: Not on file    Highest education level: Not on file   Occupational History    Not on file   Tobacco Use    Smoking status: Never    Smokeless tobacco: Never   Substance and Sexual Activity    Alcohol use: Yes     Comment: occ    Drug use: No    Sexual activity: Yes     Partners: Male   Other Topics Concern    Parent/sibling w/ CABG, MI or angioplasty before 65F 55M? Not Asked   Social History Narrative    Not on file     Social Determinants of Health     Financial Resource Strain: Not on file   Food Insecurity: Not on file   Transportation Needs: Not on file   Physical Activity: Not on file   Stress: Not on file   Social Connections: Not on file   Interpersonal Safety: Not on file   Housing Stability: Not on file        OBJECTIVE     Vital signs reviewed by Carlos Brand PA-C  BP (!) 141/79   Pulse 76   Temp 97.5  F (36.4  C) (Tympanic)   Resp 24   Wt 52.3 kg (115 lb 4.8 oz)   SpO2 99%   BMI 25.12 kg/m       Physical Exam  Vitals and nursing note reviewed.   Constitutional:        General: She is not in acute distress.     Appearance: She is well-developed. She is not ill-appearing, toxic-appearing or diaphoretic.   HENT:      Head: Normocephalic and atraumatic.      Right Ear: Hearing, tympanic membrane, ear canal and external ear normal. Tympanic membrane is not perforated, erythematous, retracted or bulging.      Left Ear: Hearing, tympanic membrane, ear canal and external ear normal. Tympanic membrane is not perforated, erythematous, retracted or bulging.      Nose: Congestion and rhinorrhea present. No mucosal edema.      Right Sinus: No maxillary sinus tenderness or frontal sinus tenderness.      Left Sinus: No maxillary sinus tenderness or frontal sinus tenderness.      Mouth/Throat:      Mouth: Mucous membranes are moist.      Pharynx: Posterior oropharyngeal erythema present. No pharyngeal swelling, oropharyngeal exudate or uvula swelling.      Tonsils: No tonsillar exudate or tonsillar abscesses. 0 on the right. 0 on the left.   Eyes:      General:         Right eye: No discharge.         Left eye: No discharge.      Conjunctiva/sclera: Conjunctivae normal.      Pupils: Pupils are equal, round, and reactive to light.   Cardiovascular:      Rate and Rhythm: Normal rate and regular rhythm.      Heart sounds: Normal heart sounds. No murmur heard.     No friction rub. No gallop.   Pulmonary:      Effort: Pulmonary effort is normal. No respiratory distress.      Breath sounds: Normal breath sounds. No decreased breath sounds, wheezing, rhonchi or rales.   Chest:      Chest wall: No tenderness.   Abdominal:      Palpations: Abdomen is soft.   Musculoskeletal:      Cervical back: Normal range of motion and neck supple.   Lymphadenopathy:      Head:      Right side of head: No submental, submandibular, tonsillar, preauricular or posterior auricular adenopathy.      Left side of head: No submental, submandibular, tonsillar, preauricular or posterior auricular adenopathy.      Cervical:  No cervical adenopathy.      Right cervical: No superficial or posterior cervical adenopathy.     Left cervical: No superficial or posterior cervical adenopathy.   Skin:     General: Skin is warm and dry.      Findings: No rash.   Neurological:      Mental Status: She is alert and oriented to person, place, and time.      Cranial Nerves: No cranial nerve deficit.   Psychiatric:         Behavior: Behavior normal. Behavior is cooperative.         Thought Content: Thought content normal.         Judgment: Judgment normal.           Carlos Brand PA-C  1/18/2024, 10:06 AM

## 2024-01-19 LAB — SARS-COV-2 RNA RESP QL NAA+PROBE: NEGATIVE

## 2024-04-15 ENCOUNTER — OFFICE VISIT (OUTPATIENT)
Dept: FAMILY MEDICINE | Facility: CLINIC | Age: 44
End: 2024-04-15
Payer: COMMERCIAL

## 2024-04-15 ENCOUNTER — TELEPHONE (OUTPATIENT)
Dept: FAMILY MEDICINE | Facility: CLINIC | Age: 44
End: 2024-04-15

## 2024-04-15 VITALS
HEIGHT: 56 IN | SYSTOLIC BLOOD PRESSURE: 115 MMHG | TEMPERATURE: 97.5 F | WEIGHT: 118 LBS | OXYGEN SATURATION: 97 % | BODY MASS INDEX: 26.54 KG/M2 | RESPIRATION RATE: 16 BRPM | DIASTOLIC BLOOD PRESSURE: 72 MMHG | HEART RATE: 90 BPM

## 2024-04-15 DIAGNOSIS — Z12.31 VISIT FOR SCREENING MAMMOGRAM: ICD-10-CM

## 2024-04-15 DIAGNOSIS — B37.31 YEAST INFECTION OF THE VAGINA: ICD-10-CM

## 2024-04-15 DIAGNOSIS — Z00.00 ROUTINE GENERAL MEDICAL EXAMINATION AT A HEALTH CARE FACILITY: Primary | ICD-10-CM

## 2024-04-15 DIAGNOSIS — Z13.1 SCREENING FOR DIABETES MELLITUS: ICD-10-CM

## 2024-04-15 DIAGNOSIS — B96.89 BV (BACTERIAL VAGINOSIS): ICD-10-CM

## 2024-04-15 DIAGNOSIS — Z13.220 LIPID SCREENING: ICD-10-CM

## 2024-04-15 DIAGNOSIS — N76.0 BV (BACTERIAL VAGINOSIS): ICD-10-CM

## 2024-04-15 DIAGNOSIS — Z12.4 CERVICAL CANCER SCREENING: ICD-10-CM

## 2024-04-15 LAB
ALBUMIN SERPL BCG-MCNC: 4.1 G/DL (ref 3.5–5.2)
ALP SERPL-CCNC: 55 U/L (ref 40–150)
ALT SERPL W P-5'-P-CCNC: 9 U/L (ref 0–50)
ANION GAP SERPL CALCULATED.3IONS-SCNC: 9 MMOL/L (ref 7–15)
AST SERPL W P-5'-P-CCNC: 19 U/L (ref 0–45)
BILIRUB SERPL-MCNC: 0.2 MG/DL
BUN SERPL-MCNC: 16.8 MG/DL (ref 6–20)
CALCIUM SERPL-MCNC: 8.9 MG/DL (ref 8.6–10)
CHLORIDE SERPL-SCNC: 108 MMOL/L (ref 98–107)
CHOLEST SERPL-MCNC: 190 MG/DL
CLUE CELLS: PRESENT
CREAT SERPL-MCNC: 0.51 MG/DL (ref 0.51–0.95)
DEPRECATED HCO3 PLAS-SCNC: 22 MMOL/L (ref 22–29)
EGFRCR SERPLBLD CKD-EPI 2021: >90 ML/MIN/1.73M2
ERYTHROCYTE [DISTWIDTH] IN BLOOD BY AUTOMATED COUNT: 13.6 % (ref 10–15)
FASTING STATUS PATIENT QL REPORTED: YES
GLUCOSE SERPL-MCNC: 82 MG/DL (ref 70–99)
HBA1C MFR BLD: 5.1 % (ref 0–5.6)
HCT VFR BLD AUTO: 38.8 % (ref 35–47)
HDLC SERPL-MCNC: 44 MG/DL
HGB BLD-MCNC: 12.6 G/DL (ref 11.7–15.7)
LDLC SERPL CALC-MCNC: 118 MG/DL
MCH RBC QN AUTO: 28.5 PG (ref 26.5–33)
MCHC RBC AUTO-ENTMCNC: 32.5 G/DL (ref 31.5–36.5)
MCV RBC AUTO: 88 FL (ref 78–100)
NONHDLC SERPL-MCNC: 146 MG/DL
PLATELET # BLD AUTO: 279 10E3/UL (ref 150–450)
POTASSIUM SERPL-SCNC: 4 MMOL/L (ref 3.4–5.3)
PROT SERPL-MCNC: 7 G/DL (ref 6.4–8.3)
RBC # BLD AUTO: 4.42 10E6/UL (ref 3.8–5.2)
SODIUM SERPL-SCNC: 139 MMOL/L (ref 135–145)
TRICHOMONAS, WET PREP: ABNORMAL
TRIGL SERPL-MCNC: 142 MG/DL
WBC # BLD AUTO: 6.3 10E3/UL (ref 4–11)
WBC'S/HIGH POWER FIELD, WET PREP: ABNORMAL
YEAST, WET PREP: PRESENT

## 2024-04-15 PROCEDURE — 99213 OFFICE O/P EST LOW 20 MIN: CPT | Mod: 25 | Performed by: PHYSICIAN ASSISTANT

## 2024-04-15 PROCEDURE — 83036 HEMOGLOBIN GLYCOSYLATED A1C: CPT | Performed by: PHYSICIAN ASSISTANT

## 2024-04-15 PROCEDURE — 80061 LIPID PANEL: CPT | Performed by: PHYSICIAN ASSISTANT

## 2024-04-15 PROCEDURE — 87624 HPV HI-RISK TYP POOLED RSLT: CPT | Performed by: PHYSICIAN ASSISTANT

## 2024-04-15 PROCEDURE — 80053 COMPREHEN METABOLIC PANEL: CPT | Performed by: PHYSICIAN ASSISTANT

## 2024-04-15 PROCEDURE — 90471 IMMUNIZATION ADMIN: CPT | Performed by: PHYSICIAN ASSISTANT

## 2024-04-15 PROCEDURE — 90746 HEPB VACCINE 3 DOSE ADULT IM: CPT | Performed by: PHYSICIAN ASSISTANT

## 2024-04-15 PROCEDURE — 90715 TDAP VACCINE 7 YRS/> IM: CPT | Performed by: PHYSICIAN ASSISTANT

## 2024-04-15 PROCEDURE — 85027 COMPLETE CBC AUTOMATED: CPT | Performed by: PHYSICIAN ASSISTANT

## 2024-04-15 PROCEDURE — 99396 PREV VISIT EST AGE 40-64: CPT | Mod: 25 | Performed by: PHYSICIAN ASSISTANT

## 2024-04-15 PROCEDURE — G0145 SCR C/V CYTO,THINLAYER,RESCR: HCPCS | Performed by: PHYSICIAN ASSISTANT

## 2024-04-15 PROCEDURE — 87210 SMEAR WET MOUNT SALINE/INK: CPT | Performed by: PHYSICIAN ASSISTANT

## 2024-04-15 PROCEDURE — 36415 COLL VENOUS BLD VENIPUNCTURE: CPT | Performed by: PHYSICIAN ASSISTANT

## 2024-04-15 PROCEDURE — 90472 IMMUNIZATION ADMIN EACH ADD: CPT | Performed by: PHYSICIAN ASSISTANT

## 2024-04-15 RX ORDER — FLUCONAZOLE 150 MG/1
150 TABLET ORAL
Qty: 3 TABLET | Refills: 0 | Status: SHIPPED | OUTPATIENT
Start: 2024-04-15 | End: 2024-04-22

## 2024-04-15 RX ORDER — METRONIDAZOLE 500 MG/1
500 TABLET ORAL 2 TIMES DAILY
Qty: 14 TABLET | Refills: 0 | Status: SHIPPED | OUTPATIENT
Start: 2024-04-15 | End: 2024-04-22

## 2024-04-15 SDOH — HEALTH STABILITY: PHYSICAL HEALTH: ON AVERAGE, HOW MANY MINUTES DO YOU ENGAGE IN EXERCISE AT THIS LEVEL?: 20 MIN

## 2024-04-15 SDOH — HEALTH STABILITY: PHYSICAL HEALTH: ON AVERAGE, HOW MANY DAYS PER WEEK DO YOU ENGAGE IN MODERATE TO STRENUOUS EXERCISE (LIKE A BRISK WALK)?: 1 DAY

## 2024-04-15 ASSESSMENT — SOCIAL DETERMINANTS OF HEALTH (SDOH): HOW OFTEN DO YOU GET TOGETHER WITH FRIENDS OR RELATIVES?: ONCE A WEEK

## 2024-04-15 ASSESSMENT — PAIN SCALES - GENERAL: PAINLEVEL: NO PAIN (0)

## 2024-04-15 NOTE — PATIENT INSTRUCTIONS
At [facility], we strive to deliver an exceptional experience to you, every time we see you. If you receive a survey, please complete it as we do value your feedback.  If you have MyChart, you can expect to receive results automatically within 24 hours of their completion.  Your provider will send a note interpreting your results as well.   If you do not have MyChart, you should receive your results in about a week by mail.    Your care team:                            Family Medicine Internal Medicine   MD Karel Gonzales, MD Donna Downey, MD Tapan Hirsch, MD Elena Dodge, PA-C    Solomon Sage, MD Pediatrics   Skip Elizondo, PA-C  Mala Baxter, MD Alvina Velásquez, MD Kasandra Bucio APRN CNP   SIGRID Harrison CNP, MD Megan Polanco, MD Rita Fernández, CNP  Same-Day (No follow up visit)   Nestor Owusu, CARLOS Duffy, PA-C    Marianna Francois PAJavierC     Clinic hours: Monday - Thursday 7 am-6 pm; Fridays 7 am-5 pm.   Urgent care: Monday - Friday 10 am- 8 pm; Saturday and Sunday 9 am-5 pm.    Clinic: [phone]       [facility]: Monday - Thursday 8 am - 7 pm; Friday 8 am - 6 pm  [facility]: [phone]     Preventive Care Advice   This is general advice given by our system to help you stay healthy. However, your care team may have specific advice just for you. Please talk to your care team about your preventive care needs.  Nutrition  Eat 5 or more servings of fruits and vegetables each day.  Try wheat bread, brown rice and whole grain pasta (instead of white bread, rice, and pasta).  Get enough calcium and vitamin D. Check the label on foods and aim for 100% of the RDA (recommended daily allowance).  Lifestyle  Exercise at least 150 minutes each week   (30 minutes a day, 5 days a week).  Do muscle strengthening activities 2 days a week. These help control  your weight and prevent disease.  No smoking.  Wear sunscreen to prevent skin cancer.  Have a dental exam and cleaning every 6 months.  Yearly exams  See your health care team every year to talk about:  Any changes in your health.  Any medicines your care team has prescribed.  Preventive care, family planning, and ways to prevent chronic diseases.  Shots (vaccines)   HPV shots (up to age 26), if you've never had them before.  Hepatitis B shots (up to age 59), if you've never had them before.  COVID-19 shot: Get this shot when it's due.  Flu shot: Get a flu shot every year.  Tetanus shot: Get a tetanus shot every 10 years.  Pneumococcal, hepatitis A, and RSV shots: Ask your care team if you need these based on your risk.  Shingles shot (for age 50 and up).  General health tests  Diabetes screening:  Starting at age 35, Get screened for diabetes at least every 3 years.  If you are younger than age 35, ask your care team if you should be screened for diabetes.  Cholesterol test: At age 39, start having a cholesterol test every 5 years, or more often if advised.  Bone density scan (DEXA): At age 50, ask your care team if you should have this scan for osteoporosis (brittle bones).  Hepatitis C: Get tested at least once in your life.  STIs (sexually transmitted infections)  Before age 24: Ask your care team if you should be screened for STIs.  After age 24: Get screened for STIs if you're at risk. You are at risk for STIs (including HIV) if:  You are sexually active with more than one person.  You don't use condoms every time.  You or a partner was diagnosed with a sexually transmitted infection.  If you are at risk for HIV, ask about PrEP medicine to prevent HIV.  Get tested for HIV at least once in your life, whether you are at risk for HIV or not.  Cancer screening tests  Cervical cancer screening: If you have a cervix, begin getting regular cervical cancer screening tests at age 21. Most people who have regular  screenings with normal results can stop after age 65. Talk about this with your provider.  Breast cancer scan (mammogram): If you've ever had breasts, begin having regular mammograms starting at age 40. This is a scan to check for breast cancer.  Colon cancer screening: It is important to start screening for colon cancer at age 45.  Have a colonoscopy test every 10 years (or more often if you're at risk) Or, ask your provider about stool tests like a FIT test every year or Cologuard test every 3 years.  To learn more about your testing options, visit: https://www.[employer]/824147.pdf.  For help making a decision, visit: https://bit.[employer]/ql88833.  Prostate cancer screening test: If you have a prostate and are age 55 to 69, ask your provider if you would benefit from a yearly prostate cancer screening test.  Lung cancer screening: If you are a current or former smoker age 50 to 80, ask your care team if ongoing lung cancer screenings are right for you.  For informational purposes only. Not to replace the advice of your health care provider. Copyright   2023 [employer]. All rights reserved. Clinically reviewed by the [facility] Transitions Program. Secrette [id number] - REV 01/24.

## 2024-04-15 NOTE — TELEPHONE ENCOUNTER
RN spoke with pt regarding provider message below. Pt verbalized understanding and is agreeable with plan. No questions at this time.     Megan Cruz RN      ----- Message from Steph Dodge PA-C sent at 4/15/2024 10:51 AM CDT -----  Please call the patient with these results:      Vaginal swab showed bacterial vaginosis and yeast infection.   Please take    Bacterial vaginosis-metronidazole 500 mg twice a day for 7 days  Yeast- diflucan 150 mg 1 tablet every 3 days for 3 doses     Diabetes test is andrey-no DM.   Hemoglobin is within normal limits. Other labs are still pending    Steph GRIFFIN

## 2024-04-15 NOTE — NURSING NOTE
Prior to immunization administration, verified patients identity using patient s name and date of birth. Please see Immunization Activity for additional information.     Screening Questionnaire for Adult Immunization    Are you sick today?   No   Do you have allergies to medications, food, a vaccine component or latex?   No   Have you ever had a serious reaction after receiving a vaccination?   No   Do you have a long-term health problem with heart, lung, kidney, or metabolic disease (e.g., diabetes), asthma, a blood disorder, no spleen, complement component deficiency, a cochlear implant, or a spinal fluid leak?  Are you on long-term aspirin therapy?   No   Do you have cancer, leukemia, HIV/AIDS, or any other immune system problem?   No   Do you have a parent, brother, or sister with an immune system problem?   No   In the past 3 months, have you taken medications that affect  your immune system, such as prednisone, other steroids, or anticancer drugs; drugs for the treatment of rheumatoid arthritis, Crohn s disease, or psoriasis; or have you had radiation treatments?   No   Have you had a seizure, or a brain or other nervous system problem?   No   During the past year, have you received a transfusion of blood or blood    products, or been given immune (gamma) globulin or antiviral drug?   No   For women: Are you pregnant or is there a chance you could become       pregnant during the next month?   No   Have you received any vaccinations in the past 4 weeks?   No     Immunization questionnaire answers were all negative.      Patient instructed to remain in clinic for 15 minutes afterwards, and to report any adverse reactions.     Screening performed by Ernesto Rogel MA on 4/15/2024 at 9:29 AM.

## 2024-04-15 NOTE — PROGRESS NOTES
"Preventive Care Visit  [facility]  Steph Dodge PA-C, Family Medicine  Apr 15, 2024      Assessment & Plan   Problem List Items Addressed This Visit    None  Visit Diagnoses       Routine general medical examination at a health care facility    -  Primary    Relevant Orders    Comprehensive metabolic panel (BMP + Alb, Alk Phos, ALT, AST, Total. Bili, TP)    CBC with platelets (Completed)    Cervical cancer screening        Relevant Orders    Pap Screen with HPV - recommended age 30 - 65 years    Visit for screening mammogram        Relevant Orders    MA SCREENING DIGITAL BILAT - Future  (s+30)    Yeast infection of the vagina        Relevant Medications    fluconazole (DIFLUCAN) 150 MG tablet    Other Relevant Orders    Wet prep - lab collect (Completed)    Screening for diabetes mellitus        Relevant Orders    Hemoglobin A1c (Completed)    Lipid screening        Relevant Orders    Lipid panel reflex to direct LDL Fasting    BV (bacterial vaginosis)        Relevant Medications    metroNIDAZOLE (FLAGYL) 500 MG tablet         Bacterial vaginosis-metronidazole 500 mg twice a day for 7 days  Yeast- diflucan 150 mg 1 tablet every 3 days for 3 doses       Patient has been advised of split billing requirements and indicates understanding: Yes    30 minutes spent by me on the date of the encounter doing chart review, history and exam, documentation and further activities per the note     BMI  Estimated body mass index is 26.17 kg/m  as calculated from the following:    Height as of this encounter: 1.43 m (4' 8.3\").    Weight as of this encounter: 53.5 kg (118 lb).   Weight management plan: Discussed healthy diet and exercise guidelines    Counseling  Appropriate preventive services were discussed with this patient, including applicable screening as appropriate for fall prevention, nutrition, physical activity, Tobacco-use cessation, weight loss and cognition.  Checklist reviewing " preventive services available has been given to the patient.  Reviewed patient's diet, addressing concerns and/or questions.   She is at risk for lack of exercise and has been provided with information to increase physical activity for the benefit of her well-being.           Lisa Brown is a 43 year old, presenting for the following:  Physical        4/15/2024     8:07 AM   Additional Questions   Roomed by Ernesto   Accompanied by Self        Via the Health Maintenance questionnaire, the patient has reported the following services have been completed -Mammogram, this information has been sent to the abstraction team.  Health Care Directive  Patient does not have a Health Care Directive or Living Will:     HPI      Vaginal Symptoms  Onset/Duration: on and off chronic  Description:  Vaginal Discharge: none   Itching (Pruritis): YES- vulva only, no internal discomfort  Burning sensation:  YES, vulva  Odor: No  Accompanying Signs & Symptoms:  Urinary symptoms: No  Abdominal pain: No  Fever: No  History:   Sexually active: YES  New Partner: No  Possibility of Pregnancy:  No  Recent antibiotic use: No  Previous vaginitis issues: No  Precipitating or alleviating factors: None  Therapies tried and outcome: none  No symptom today. When she gets symptom she fells itching , erythema and mild swelling of the labia minora and around the clitoris. Resolves spontaneously.       4/15/2024   General Health   How would you rate your overall physical health? Excellent   Feel stress (tense, anxious, or unable to sleep) Not at all         4/15/2024   Nutrition   Three or more servings of calcium each day? Yes   Diet: Regular (no restrictions)   How many servings of fruit and vegetables per day? (!) 0-1   How many sweetened beverages each day? 0-1         4/15/2024   Exercise   Days per week of moderate/strenous exercise 1 day   Average minutes spent exercising at this level 20 min   (!) EXERCISE CONCERN      4/15/2024   Social  Factors   Frequency of gathering with friends or relatives Once a week   Worry food won't last until get money to buy more No   Food not last or not have enough money for food? No   Do you have housing?  Yes   Are you worried about losing your housing? No   Lack of transportation? No   Unable to get utilities (heat,electricity)? No         4/15/2024   Dental   Dentist two times every year? Yes         4/15/2024   TB Screening   Were you born outside of the [place]? Yes         Today's PHQ-2 Score:       4/15/2024     8:01 AM   PHQ-2 ( 1999 [employer])   Q1: Little interest or pleasure in doing things 0   Q2: Feeling down, depressed or hopeless 0   PHQ-2 Score 0   Q1: Little interest or pleasure in doing things Not at all   Q2: Feeling down, depressed or hopeless Not at all   PHQ-2 Score 0           4/15/2024   Substance Use   Alcohol more than 3/day or more than 7/wk No   Do you use any other substances recreationally? No     Social History     Tobacco Use    Smoking status: Never    Smokeless tobacco: Never   Substance Use Topics    Alcohol use: Yes     Comment: occ    Drug use: No             4/15/2024   Breast Cancer Screening   Family history of breast, colon, or ovarian cancer? No / Unknown         4/26/2022   LAST FHS-7 RESULTS   1st degree relative breast or ovarian cancer No   Any relative bilateral breast cancer No   Any male have breast cancer No   Any ONE woman have BOTH breast AND ovarian cancer No   Any woman with breast cancer before 50yrs No   2 or more relatives with breast AND/OR ovarian cancer No   2 or more relatives with breast AND/OR bowel cancer No        Mammogram Screening - Mammogram every 1-2 years updated in Health Maintenance based on mutual decision making        4/15/2024   STI Screening   New sexual partner(s) since last STI/HIV test? No     History of abnormal Pap smear: NO - age 30-65 PAP every 5 years with negative HPV co-testing recommended        Latest Ref Rng & Units 12/3/2018     9:22  AM 12/3/2018     8:40 AM   PAP / HPV   PAP (Historical)  NIL     HPV 16 DNA NEG^Negative  Negative    HPV 18 DNA NEG^Negative  Negative    Other HR HPV NEG^Negative  Negative      ASCVD Risk   The 10-year ASCVD risk score (Daniel SEBASTIAN, et al., 2019) is: 0.6%    Values used to calculate the score:      Age: 43 years      Sex: Female      Is Non- : No      Diabetic: No      Tobacco smoker: No      Systolic Blood Pressure: 115 mmHg      Is BP treated: No      HDL Cholesterol: 50 mg/dL      Total Cholesterol: 194 mg/dL        4/15/2024   Contraception/Family Planning   Questions about contraception or family planning No        Reviewed and updated as needed this visit by Provider   Tobacco  Allergies  Meds  Problems  Med Hx  Surg Hx  Fam Hx            Patient Active Problem List   Diagnosis    Lumbar discitis    Chronic bilateral low back pain without sciatica    Tendonitis of wrist, left     History reviewed. No pertinent surgical history.    Social History     Tobacco Use    Smoking status: Never    Smokeless tobacco: Never   Substance Use Topics    Alcohol use: Yes     Comment: occ     Family History   Problem Relation Age of Onset    Diabetes Mother          Current Outpatient Medications   Medication Sig Dispense Refill    fluconazole (DIFLUCAN) 150 MG tablet Take 1 tablet (150 mg) by mouth every 3 days for 3 doses 3 tablet 0    metroNIDAZOLE (FLAGYL) 500 MG tablet Take 1 tablet (500 mg) by mouth 2 times daily for 7 days 14 tablet 0    omeprazole (PRILOSEC) 20 MG DR capsule Take 1 capsule (20 mg) by mouth daily (Patient not taking: Reported on 11/22/2023) 90 capsule 1     Allergies   Allergen Reactions    No Clinical Screening - See Comments          Review of Systems  Constitutional, HEENT, cardiovascular, pulmonary, gi and gu systems are negative, except as otherwise noted.     Objective    Exam  /72 (BP Location: Left arm, Patient Position: Chair, Cuff Size: Adult  "Regular)   Pulse 90   Temp 97.5  F (36.4  C) (Tympanic)   Resp 16   Ht 1.43 m (4' 8.3\")   Wt 53.5 kg (118 lb)   LMP 03/22/2024 (Exact Date)   SpO2 97%   BMI 26.17 kg/m     Estimated body mass index is 26.17 kg/m  as calculated from the following:    Height as of this encounter: 1.43 m (4' 8.3\").    Weight as of this encounter: 53.5 kg (118 lb).    Physical Exam  GENERAL: alert and no distress  EYES: Eyes grossly normal to inspection, PERRL and conjunctivae and sclerae normal  HENT: ear canals and TM's normal, nose and mouth without ulcers or lesions  NECK: no adenopathy, no asymmetry, masses, or scars  RESP: lungs clear to auscultation - no rales, rhonchi or wheezes  BREAST: normal without masses, tenderness or nipple discharge and no palpable axillary masses or adenopathy  CV: regular rate and rhythm, normal S1 S2, no S3 or S4, no murmur, click or rub, no peripheral edema  ABDOMEN: soft, nontender, no hepatosplenomegaly, no masses and bowel sounds normal   (female) w/bimanual: normal female external genitalia, normal urethral meatus, normal vaginal mucosa, and normal cervix/adnexa/uterus without masses or discharge  MS: no gross musculoskeletal defects noted, no edema  SKIN: no suspicious lesions or rashes  NEURO: Normal strength and tone, mentation intact and speech normal  PSYCH: mentation appears normal, affect normal/bright    Diagnostic Test Results:  Results for orders placed or performed in visit on 04/15/24 (from the past 24 hour(s))   Wet prep - lab collect    Specimen: Vagina; Swab   Result Value Ref Range    Trichomonas Absent Absent    Yeast Present (A) Absent    Clue Cells Present (A) Absent    WBCs/high power field 1+ (A) None   Hemoglobin A1c   Result Value Ref Range    Hemoglobin A1C 5.1 0.0 - 5.6 %   CBC with platelets   Result Value Ref Range    WBC Count 6.3 4.0 - 11.0 10e3/uL    RBC Count 4.42 3.80 - 5.20 10e6/uL    Hemoglobin 12.6 11.7 - 15.7 g/dL    Hematocrit 38.8 35.0 - 47.0 %    " MCV 88 78 - 100 fL    MCH 28.5 26.5 - 33.0 pg    MCHC 32.5 31.5 - 36.5 g/dL    RDW 13.6 10.0 - 15.0 %    Platelet Count 279 150 - 450 10e3/uL         Signed Electronically by: Steph Dodge PA-C

## 2024-04-15 NOTE — RESULT ENCOUNTER NOTE
Please call the patient with these results:      Vaginal swab showed bacterial vaginosis and yeast infection.   Please take    Bacterial vaginosis-metronidazole 500 mg twice a day for 7 days  Yeast- diflucan 150 mg 1 tablet every 3 days for 3 doses     Diabetes test is andrey-no DM.   Hemoglobin is within normal limits. Other labs are still pending    Steph GRIFFIN

## 2024-04-17 LAB
BKR LAB AP GYN ADEQUACY: NORMAL
BKR LAB AP GYN INTERPRETATION: NORMAL
BKR LAB AP HPV REFLEX: NORMAL
BKR LAB AP PREVIOUS ABNORMAL: NORMAL
PATH REPORT.COMMENTS IMP SPEC: NORMAL
PATH REPORT.COMMENTS IMP SPEC: NORMAL
PATH REPORT.RELEVANT HX SPEC: NORMAL

## 2024-04-18 LAB
HUMAN PAPILLOMA VIRUS 16 DNA: NEGATIVE
HUMAN PAPILLOMA VIRUS 18 DNA: NEGATIVE
HUMAN PAPILLOMA VIRUS FINAL DIAGNOSIS: NORMAL
HUMAN PAPILLOMA VIRUS OTHER HR: NEGATIVE

## 2024-08-09 ENCOUNTER — ANCILLARY PROCEDURE (OUTPATIENT)
Dept: MAMMOGRAPHY | Facility: CLINIC | Age: 44
End: 2024-08-09
Payer: COMMERCIAL

## 2024-08-09 DIAGNOSIS — Z12.31 VISIT FOR SCREENING MAMMOGRAM: ICD-10-CM

## 2024-08-09 PROCEDURE — 77067 SCR MAMMO BI INCL CAD: CPT | Mod: TC | Performed by: RADIOLOGY

## 2024-08-09 PROCEDURE — 77063 BREAST TOMOSYNTHESIS BI: CPT | Mod: TC | Performed by: RADIOLOGY

## 2025-01-03 ENCOUNTER — ANCILLARY PROCEDURE (OUTPATIENT)
Dept: MRI IMAGING | Facility: CLINIC | Age: 45
End: 2025-01-03
Attending: EMERGENCY MEDICINE
Payer: COMMERCIAL

## 2025-01-03 PROCEDURE — A9585 GADOBUTROL INJECTION: HCPCS | Performed by: STUDENT IN AN ORGANIZED HEALTH CARE EDUCATION/TRAINING PROGRAM

## 2025-01-03 PROCEDURE — 70553 MRI BRAIN STEM W/O & W/DYE: CPT | Performed by: STUDENT IN AN ORGANIZED HEALTH CARE EDUCATION/TRAINING PROGRAM

## 2025-01-03 RX ORDER — GADOBUTROL 604.72 MG/ML
5.5 INJECTION INTRAVENOUS ONCE
Status: COMPLETED | OUTPATIENT
Start: 2025-01-03 | End: 2025-01-03

## 2025-01-03 RX ADMIN — GADOBUTROL 5.5 ML: 604.72 INJECTION INTRAVENOUS at 14:10

## 2025-01-09 ENCOUNTER — OFFICE VISIT (OUTPATIENT)
Dept: FAMILY MEDICINE | Facility: CLINIC | Age: 45
End: 2025-01-09
Payer: COMMERCIAL

## 2025-01-09 VITALS
BODY MASS INDEX: 26.77 KG/M2 | TEMPERATURE: 97.2 F | DIASTOLIC BLOOD PRESSURE: 80 MMHG | OXYGEN SATURATION: 100 % | SYSTOLIC BLOOD PRESSURE: 128 MMHG | WEIGHT: 119 LBS | HEIGHT: 56 IN | RESPIRATION RATE: 17 BRPM | HEART RATE: 52 BPM

## 2025-01-09 DIAGNOSIS — K59.01 SLOW TRANSIT CONSTIPATION: ICD-10-CM

## 2025-01-09 DIAGNOSIS — R42 DIZZINESS: ICD-10-CM

## 2025-01-09 DIAGNOSIS — G43.909 MIGRAINE WITHOUT STATUS MIGRAINOSUS, NOT INTRACTABLE, UNSPECIFIED MIGRAINE TYPE: Primary | ICD-10-CM

## 2025-01-09 DIAGNOSIS — R42 VERTIGO: ICD-10-CM

## 2025-01-09 RX ORDER — SUMATRIPTAN 50 MG/1
50 TABLET, FILM COATED ORAL
Qty: 20 TABLET | Refills: 0 | Status: SHIPPED | OUTPATIENT
Start: 2025-01-09

## 2025-01-09 RX ORDER — MECLIZINE HYDROCHLORIDE 25 MG/1
25 TABLET ORAL
Qty: 20 TABLET | Refills: 0 | Status: SHIPPED | OUTPATIENT
Start: 2025-01-09

## 2025-01-09 RX ORDER — POLYETHYLENE GLYCOL 3350 17 G/17G
1 POWDER, FOR SOLUTION ORAL DAILY
Qty: 578 G | Refills: 2 | Status: SHIPPED | OUTPATIENT
Start: 2025-01-09

## 2025-01-09 ASSESSMENT — PAIN SCALES - GENERAL: PAINLEVEL_OUTOF10: MILD PAIN (3)

## 2025-01-09 NOTE — PROGRESS NOTES
"  Assessment & Plan   Problem List Items Addressed This Visit    None  Visit Diagnoses       Migraine without status migrainosus, not intractable, unspecified migraine type    -  Primary    Relevant Medications    SUMAtriptan (IMITREX) 50 MG tablet    Other Relevant Orders    Adult Neurology  Referral    Dizziness        Relevant Medications    meclizine (ANTIVERT) 25 MG tablet    Other Relevant Orders    Adult Neurology  Referral    Physical Therapy  Referral    Vertigo        Relevant Medications    meclizine (ANTIVERT) 25 MG tablet    Slow transit constipation        Relevant Medications    polyethylene glycol (MIRALAX) 17 GM/Dose powder           Continue Tylenol   Sumatriptan (Imitrex) 50 mg at onset, may repeat every 2 hours max 4 tablet per 24 hours   Meclizine 25 mg three times a day as needed   Follow up with balance therapy for dizziness  Follow up with neurology to address abnormal findings on MRI        BMI  Estimated body mass index is 26.68 kg/m  as calculated from the following:    Height as of this encounter: 1.422 m (4' 8\").    Weight as of this encounter: 54 kg (119 lb).   Weight management plan: Discussed healthy diet and exercise guidelines          Lisa Brown is a 44 year old, presenting for the following health issues:  Follow Up ([facility] follow up)        1/9/2025    10:45 AM   Additional Questions   Roomed by Audra   Accompanied by self         1/9/2025    10:45 AM   Patient Reported Additional Medications   Patient reports taking the following new medications yes- Meclizine     HPI       Dizziness  Onset/Duration: 1 week, improved well after [facility] treatment   Description:   Do you feel faint: No  Does it feel like the surroundings (bed, room) are moving: YES  Unsteady/off balance: YES  Have you passed out or fallen: No  Intensity: severe  Progression of Symptoms: improving  Accompanying Signs & Symptoms:  Heart palpitations or chest pain: No  Nausea, vomiting: " No  Weakness or lack of coordination in arms or legs: No  Vision or speech changes: No  Numbness or tingling: No  Ringing in ears (Tinnitus): No  Hearing Loss: No  History:   Head trauma/concussion history: No  Previous similar symptoms: No  Recent bleeding history: No  Any new medications (BP?): Yes, Meclizine, makes pt sleepy  Precipitating factors:   Worse with activity: YES  Worse with head movement: YES  Alleviating factors:   Does staying in a fixed position give relief: YES  Therapies tried and outcome: None    Headaches    Duration: 7 days  Description  Location: bilateral in the occipital area   Character: throbbing pain  Frequency:    Duration:  7 days  Intensity:  mild, moderate  Accompanying signs and symptoms:  Precipitating or Alleviating factors:  Nausea/vomiting: had initially, but resolved after treatment at [facility]  Dizziness: sometimes  Weakness or numbness: no  Visual changes: none  Fever: no   Sinus or URI symptoms no   History  Head trauma: no   Family history of migraines: no   Previous tests for headaches: YES- MRI  Neurologist evaluations: no  Able to do daily activities when headache present: no  Wake with headaches: no  Daily pain medication use: no  Any changes in:   Precipitating or Alleviating factors (light/sound/sleep/caffeine): none  Therapies tried and outcome: Tylenol. Meclizine, Toradol    Outcome - effective  Frequent/daily pain medication use: no    Constipation    Duration: 1.5 years  Description:       Frequency of bowel movements: 2-3 days       Consistency of stool: dry  Intensity:  moderate  Accompanying signs and symptoms: none       Abdominal pain: no        Rectal pain: no        Blood in stool: no        Nausea/vomitting: no   History:        Similar problems in past: YES  Precipitating or alleviating factors: patient take senna tea every other day        Medications worsening symptoms: no   Therapies tried and outcome: senna tea       Chronic laxative use: YES, stimulant  "        Review of Systems  Constitutional, HEENT, cardiovascular, pulmonary, gi and gu systems are negative, except as otherwise noted.      Objective    /80 (BP Location: Left arm, Patient Position: Sitting, Cuff Size: Adult Regular)   Pulse 52   Temp 97.2  F (36.2  C) (Temporal)   Resp 17   Ht 1.422 m (4' 8\")   Wt 54 kg (119 lb)   LMP 12/14/2024 (Approximate)   SpO2 100%   BMI 26.68 kg/m    Body mass index is 26.68 kg/m .  Physical Exam   GENERAL: alert and no distress  EYES: Eyes grossly normal to inspection, PERRL and conjunctivae and sclerae normal  HENT: ear canals and TM's normal, nose and mouth without ulcers or lesions  NECK: no adenopathy, no asymmetry, masses, or scars  RESP: lungs clear to auscultation - no rales, rhonchi or wheezes  CV: regular rate and rhythm, normal S1 S2, no S3 or S4, no murmur, click or rub, no peripheral edema  ABDOMEN: soft, nontender, no hepatosplenomegaly, no masses and bowel sounds normal  MS: no gross musculoskeletal defects noted, no edema  NEURO: Normal strength and tone, mentation intact and speech normal  PSYCH: mentation appears normal, affect normal/bright    Office Visit on 01/03/2025   Component Date Value Ref Range Status    Sodium 01/03/2025 142  135 - 145 mmol/L Final    Potassium 01/03/2025 4.1  3.4 - 5.3 mmol/L Final    Chloride 01/03/2025 106  98 - 107 mmol/L Final    Carbon Dioxide (CO2) 01/03/2025 24  22 - 29 mmol/L Final    Anion Gap 01/03/2025 12  7 - 15 mmol/L Final    Urea Nitrogen 01/03/2025 9.5  6.0 - 20.0 mg/dL Final    Creatinine 01/03/2025 0.51  0.51 - 0.95 mg/dL Final    GFR Estimate 01/03/2025 >90  >60 mL/min/1.73m2 Final    eGFR calculated using 2021 CKD-EPI equation.    Calcium 01/03/2025 9.1  8.8 - 10.4 mg/dL Final    Reference intervals for this test were updated on 7/16/2024 to reflect our healthy population more accurately. There may be differences in the flagging of prior results with similar values performed with this method. " Those prior results can be interpreted in the context of the updated reference intervals.    Glucose 01/03/2025 93  70 - 99 mg/dL Final    hCG Urine Qualitative 01/03/2025 Negative  Negative Final    This test is for screening purposes.  Results should be interpreted along with the clinical picture.  Confirmation testing is available if warranted by ordering [id number], HCG Quantitative Pregnancy.    WBC Count 01/03/2025 10.0  4.0 - 11.0 10e3/uL Final    RBC Count 01/03/2025 4.50  3.80 - 5.20 10e6/uL Final    Hemoglobin 01/03/2025 12.7  11.7 - 15.7 g/dL Final    Hematocrit 01/03/2025 38.4  35.0 - 47.0 % Final    MCV 01/03/2025 85  78 - 100 fL Final    MCH 01/03/2025 28.2  26.5 - 33.0 pg Final    MCHC 01/03/2025 33.1  31.5 - 36.5 g/dL Final    RDW 01/03/2025 14.4  10.0 - 15.0 % Final    Platelet Count 01/03/2025 283  150 - 450 10e3/uL Final    % Neutrophils 01/03/2025 69  % Final    % Lymphocytes 01/03/2025 24  % Final    % Monocytes 01/03/2025 6  % Final    % Eosinophils 01/03/2025 1  % Final    % Basophils 01/03/2025 1  % Final    % Immature Granulocytes 01/03/2025 1  % Final    NRBCs per 100 WBC 01/03/2025 0  <1 /100 Final    Absolute Neutrophils 01/03/2025 6.8  1.6 - 8.3 10e3/uL Final    Absolute Lymphocytes 01/03/2025 2.4  0.8 - 5.3 10e3/uL Final    Absolute Monocytes 01/03/2025 0.6  0.0 - 1.3 10e3/uL Final    Absolute Eosinophils 01/03/2025 0.1  0.0 - 0.7 10e3/uL Final    Absolute Basophils 01/03/2025 0.1  0.0 - 0.2 10e3/uL Final    Absolute Immature Granulocytes 01/03/2025 0.1  <=0.4 10e3/uL Final    Absolute NRBCs 01/03/2025 0.0  10e3/uL Final     Narrative & Impression   EXAM: MR BRAIN W/O and W CONTRAST  LOCATION: [facility]  DATE: 1/3/2025                                                                   IMPRESSION:     1.  No acute intracranial abnormality. Specifically, no evidence of recent infarct.  2.  No intracranial mass lesion.  3.  Scattered small foci of T2/FLAIR  hyperintense signal abnormality throughout the cerebral white matter. Findings are nonspecific with differential considerations including sequela of early chronic microvascular ischemic disease, sequela of migraine   headaches, or gliosis related to other prior trauma/inflammation. Appearance is not overtly suspicious for demyelinating disease.  4.  Minimal cerebellar tonsillar ectopia, not meeting criteria for Chiari I malformation. No hydrocephalus.    Signed Electronically by: Steph Dodge PA-C

## 2025-02-08 ENCOUNTER — THERAPY VISIT (OUTPATIENT)
Dept: PHYSICAL THERAPY | Facility: CLINIC | Age: 45
End: 2025-02-08
Attending: PHYSICIAN ASSISTANT
Payer: COMMERCIAL

## 2025-02-08 DIAGNOSIS — R42 DIZZINESS: ICD-10-CM

## 2025-02-08 PROCEDURE — 97162 PT EVAL MOD COMPLEX 30 MIN: CPT | Mod: GP | Performed by: PHYSICAL THERAPIST

## 2025-02-08 PROCEDURE — 95992 CANALITH REPOSITIONING PROC: CPT | Mod: GP | Performed by: PHYSICAL THERAPIST

## 2025-02-08 NOTE — PROGRESS NOTES
PHYSICAL THERAPY EVALUATION  Type of Visit: Evaluation       Fall Risk Screen:  Fall screen completed by: PT  Have you fallen 2 or more times in the past year?: No  Have you fallen and had an injury in the past year?: No  Is patient a fall risk?: No    Subjective         Presenting condition or subjective complaint:   dizziness, see vertigo section below.  Date of onset: 12/23/24    Relevant medical history:   migraines, dizziness    Prior diagnostic imaging/testing results:      MRI of brain was overall normal (some signs of microvascular disease)    Employment:     office cleaning    Patient goals for therapy:   decrease her dizziness    Pain assessment: Pain denied     Objective      Cognitive Status Examination  Orientation: Oriented to person, place and time   Level of Consciousness: Alert  Follows Commands and Answers Questions: 100% of the time  Personal Safety and Judgement: Intact  Memory: Intact    OBSERVATION: arrives with  today  INTEGUMENTARY: Intact  POSTURE: WFL  PALPATION: n/a  RANGE OF MOTION: LE ROM WFL  UE ROM WFL  STRENGTH: LE Strength WFL  UE Strength WFL    BED MOBILITY:  slowly d/t dizziness    TRANSFERS: WF    WHEELCHAIR MOBILITY: n/a    GAIT:   Level of [place]: WFL  Assistive Device(s): None  Gait Deviations: WFL       VESTIBULAR EVALUATION  ADDITIONAL HISTORY:  Description of symptoms: Attacks of dizziness; Blurry or jumping vision; I feel like the room is spinning  Dizzy attacks:   Start: 6 weeks ago   Last attack: this morning   Frequency of occurrences: 2 times per day, morning and night   Length of attack: a few seconds  Difficulty hearing:    Noise in ears? No    Alleviates symptoms: laying down and sleeping  Worsens symptoms: sudden movements  Activities that bring on symptoms: Bending over       Pertinent visual history: no issues  Pertinent history of current vestibular problem: Migraines, or at least dealing with frequent HAs.   DHI: Total Score:  30    Cervicogenic Screen    Neck ROM About 25% decreased in all directions  with stiffness   Vertebral Artery Test Tolerated wilbur hallpike     Infrared Goggle Exam Vestibular Suppressant in Last 24 Hours? No  Exam Completed With: Infrared goggles   Spontaneous Nystagmus Negative   Gaze Evoked Nystagmus Negative   Head Shake Horizontal Nystagmus Negative   Positional Testing    Supine Head-Hanging Test     Left Right   Wilbur-Hallpike Negative Upbeating R torsional, delayed onset, ~30 secs and then does fatigue, reports vertigo   Sidelying Test     HSCC Supine Roll Test Negative Negative   HSCC Forward Roll Test     McFall and Lean Test -  Sitting Erect    McFall and Lean Test - Seated, Head Bent 60 Degrees Forward    McFall and Lean Test - Seated, Head Bent Backwards       BPPV Canal(s): R Posterior  BPPV Type: Canalithasis    Assessment & Plan   CLINICAL IMPRESSIONS  Medical Diagnosis: BPPV, R    Treatment Diagnosis: BPPV R   Impression/Assessment: Patient is a 44 year old female with dizziness with position changes. She is positive for R PSCC BPPV and will benefit from PT for CRPMS in order to improve her symptoms. The following significant findings have been identified: Instability, Dizziness, and Disequilibrium . These impairments interfere with their ability to perform work tasks, recreational activities, household chores, household mobility, and community mobility as compared to previous level of function.     Clinical Decision Making (Complexity):  Clinical Presentation: Stable/Uncomplicated  Clinical Presentation Rationale: based on medical and personal factors listed in PT evaluation  Clinical Decision Making (Complexity): Low complexity    PLAN OF CARE  Treatment Interventions:  Interventions: Gait Training, Neuromuscular Re-education, Therapeutic Exercise, Self-Care/Home Management    Long Term Goals     PT Goal 1  Goal Identifier: bed mobility  Goal Description: Stephanie will report no issues with bed mobility including  rolling and sitting up in order to return to prior level of function and prevent dizziness with bending when working as .  Target Date: 03/08/25      Frequency of Treatment: 1 time per week  Duration of Treatment: 4 weeks    Recommended Referrals to Other Professionals:   Education Assessment:   Learner/Method: Patient;Family;Listening;Reading;Demonstration    Risks and benefits of evaluation/treatment have been explained.   Patient/Family/caregiver agrees with Plan of Care.     Evaluation Time:     PT Eval, Moderate Complexity Minutes (06244): 25       Signing Clinician: Parris García PT

## 2025-02-11 ENCOUNTER — THERAPY VISIT (OUTPATIENT)
Dept: PHYSICAL THERAPY | Facility: CLINIC | Age: 45
End: 2025-02-11
Attending: PHYSICIAN ASSISTANT
Payer: COMMERCIAL

## 2025-02-11 DIAGNOSIS — H81.11 BENIGN PAROXYSMAL POSITIONAL VERTIGO, RIGHT: Primary | ICD-10-CM

## 2025-02-11 PROCEDURE — 97112 NEUROMUSCULAR REEDUCATION: CPT | Mod: GP,59 | Performed by: PHYSICAL THERAPIST

## 2025-02-11 PROCEDURE — 95992 CANALITH REPOSITIONING PROC: CPT | Mod: GP | Performed by: PHYSICAL THERAPIST

## 2025-02-11 NOTE — PROGRESS NOTES
DISCHARGE  Reason for Discharge: Patient has met all goals.    She was positive for R sided posterior canal BPPV and she did well with modified epley to treat this during 2 sessions. Discussed return to PT if symptoms return.    Equipment Issued: none    Discharge Plan: Patient to continue home program.    Referring Provider:  Steph Dodge         02/11/25 0500   Appointment Info   Signing clinician's name / credentials Parris García, PT, DPT   Visits Used 2   Medical Diagnosis BPPV, R   PT Tx Diagnosis BPPV R   Progress Note/Certification   Start of Care Date 02/08/25   Onset of illness/injury or Date of Surgery 12/23/24   Therapy Frequency 1 time per week   Predicted Duration 4 weeks   Progress Note Due Date 03/08/25       Present Yes    Language Singaporean    ID or First/Last Name Matthew [id number]   GOALS   PT Goals 2;3   PT Goal 1   Goal Identifier bed mobility   Goal Description Stephanie will report no issues with bed mobility including rolling and sitting up in order to return to prior level of function and prevent dizziness with bending when working as .   Goal Progress MET   Target Date 03/08/25   Date Met 02/11/25   Subjective Report   Subjective Report She is feeling that diziness is better, she does have some pressure in the forehead.   Treatment Interventions (PT)   Interventions Infrared Goggle Exam or Frenzel Lense Exam;Neuromuscular Re-education   Neuromuscular Re-education   Neuromuscular re-ed of mvmt, balance, coord, kinesthetic sense, posture, proprioception minutes (45791) 15   Neuromuscular Re-education Neuro Re-ed 2;Neuro Re-ed 3   Neuro Re-ed 1 bending down and up/looking up and down   Neuro Re-ed 1 - Details did several reps of each with no symptoms today   Skilled Intervention habituation/head motion and education on vestibular system   Patient Response/Progress tolerated well and appears back to her baseline, encouraged to continue  normal movement   Neuro Re-ed 2 head turns in with gait   Neuro Re-ed 2 - Details several reps in H and V direction with no major increase in symptoms, she had steady balance at slower speed   Neuro Re-ed 3 return to PT discussion   Neuro Re-ed 3 - Details educated on return to PT if symptoms return and would need new order if it has been a while from MD. Discussed no limitations to movement at this time and to return to normal mobility.   Infrared Goggle Exam or Frenzel Lense Exam   Vestibular Suppressant in Last 24 Hours? No   Exam completed with Infrared Goggles   Spontaneous Nystagmus Negative   Gaze Evoked Nystagmus Negative   [place]-Hallpike (Right) Other   [place]-Hallpike (Right) Comments no nystagmus, reports slight pressure in head, otherwise no dizziness   [place]-Hallpike (Left) Other   [place]-Hallpike (Left) Comments a couple L downbeats and then fatigues, no dizziness   [facility] Supine Roll Test (Right) Negative   [facility] Supine Roll Test (Left) Negative   BPPV Canal(s) R Posterior   BPPV Type Canalithasis   Standard Canalith Repositioning   Standard canalith repositioning procedure minutes (57190) 15   Canalith Repositioning - Details Goggle testing. Performed modified epley based on findings last sessions and very mild symptoms. Tolerated well with no nystagmus seen.   Skilled Intervention CRPMS for BPPV   Patient Response/Progress appears BPPV is cleared, very mild to symptoms today.   Education   Learner/Method Patient;Family;Listening;Reading;Demonstration   Plan   Plan for next session d/c   Total Session Time   Timed Code Treatment Minutes 15   Total Treatment Time (sum of timed and untimed services) 30

## 2025-03-17 ENCOUNTER — PATIENT OUTREACH (OUTPATIENT)
Dept: CARE COORDINATION | Facility: CLINIC | Age: 45
End: 2025-03-17
Payer: COMMERCIAL

## 2025-03-31 ENCOUNTER — PATIENT OUTREACH (OUTPATIENT)
Dept: CARE COORDINATION | Facility: CLINIC | Age: 45
End: 2025-03-31
Payer: COMMERCIAL

## 2025-05-20 NOTE — PROGRESS NOTES
__________________________________  ESTABLISHED PATIENT NEUROLOGY NOTE    DATE OF VISIT: 5/22/2025  MRN: [id number]  PATIENT NAME: Stephanie Goodman  YOB: 1980    Chief Complaint   Patient presents with    Headache     Patient states her headaches have improved . New patient  MRI completed in 1/25     SUBJECTIVE:                                                      HISTORY OF PRESENT ILLNESS:  Stephanie is here for follow up regarding headache    Stephanie Goodman is a 44 year old female with a history of migraine, dizziness, vertigo and slow transit constipation who presents today to establish care for migraines.  Per chart review patient continues Tylenol.  Sumatriptan at onset of headache.    05/22/25:  Stephanie presents to the clinic today to establish care for headache.  She presents with a .  Patient states that she has always had headaches.  Last January she had a severe headache with associated symptoms of vertigo and nausea.  She was seen in the emergency department.  After following up with her primary care provider therapies were ordered.  She completed 2 sessions and had improvement in her headache and dizziness.  She tells me that she still has mild dizziness lasting a few seconds when she first lays down at night.  This is accompanied by nausea that quickly resolves.    She tells me that her headaches are typically 7-8 out of 10 throbbing pain that starts at the back of her head and radiates up to the front.  She takes Tylenol 2-3 times per week with resolution of her symptoms.  Associated symptoms include phonophobia and blurred vision.  She denies photophobia, nausea or vomiting.  She has noticed that her headaches are daily and mild in nature with the exception of 2-3 more severe headaches per week that are treated with Tylenol.    She works from 5 PM to 1 AM.  She gets home and goes to bed at 2 AM and wakes up at 630 to bring her children to school.  She will often come  home and sleep for an extra hour.  She is interested in starting a preventative therapy for headache.  We discussed topiramate, propranolol and nortriptyline.  Not interested in Topamax or propranolol.  Pulses 53 in the clinic today.  We will start with a low-dose of nortriptyline.  She is no longer taking meclizine or sumatriptan as prescribed in the ED last January.  Since January Stephanie started working out on a daily basis and eating healthier meals.    We also discussed her MRI findings.  I reviewed imaging with Dr. Alcala in clinic.  Patient and I discussed how abnormal findings may be related to her headaches or vascular risk factors.  We reviewed vascular risk factors in the clinic today.  No hypertension, diabetes smoking or CONTRERAS.  LDL is slightly elevated.  We discussed healthy lifestyle modifications.  We will monitor her MRI in the next 6 months to ensure that it remains stable.     Current Medications:   Current Outpatient Medications   Medication Sig Dispense Refill    acetaminophen (TYLENOL) 325 MG tablet Take 325-650 mg by mouth every 6 hours as needed for mild pain.      nortriptyline (PAMELOR) 10 MG capsule Take 1 capsule (10 mg) by mouth at bedtime. 30 capsule 3    meclizine (ANTIVERT) 25 MG tablet Take 1 tablet (25 mg) by mouth nightly as needed for dizziness. (Patient not taking: Reported on 5/22/2025) 20 tablet 0    omeprazole (PRILOSEC) 20 MG DR capsule Take 1 capsule (20 mg) by mouth daily (Patient not taking: Reported on 5/22/2025) 90 capsule 1    polyethylene glycol (MIRALAX) 17 GM/Dose powder Take 17 g (1 Capful) by mouth daily. (Patient not taking: Reported on 5/22/2025) 578 g 2    SUMAtriptan (IMITREX) 50 MG tablet Take 1 tablet (50 mg) by mouth at onset of headache for migraine. May repeat in 2 hours. Max 4 tablets/24 hours. (Patient not taking: Reported on 5/22/2025) 20 tablet 0     No current facility-administered medications for this visit.     Past Medical History:   Patient  has no  "past medical history on file.  Surgical History:  She  has no past surgical history on file.  Family and Social History:  Reviewed, and she  reports that she has never smoked. She has never used smokeless tobacco. She reports current alcohol use. She reports that she does not use drugs.  Reviewed, and family history includes Diabetes in her mother.      RECENT DIAGNOSTIC STUDIES:     Imaging:     EXAM: MR BRAIN W/O and W CONTRAST  LOCATION: [facility]  DATE: 1/3/2025                                                               IMPRESSION:     1.  No acute intracranial abnormality. Specifically, no evidence of recent infarct.  2.  No intracranial mass lesion.  3.  Scattered small foci of T2/FLAIR hyperintense signal abnormality throughout the cerebral white matter. Findings are nonspecific with differential considerations including sequela of early chronic microvascular ischemic disease, sequela of migraine   headaches, or gliosis related to other prior trauma/inflammation. Appearance is not overtly suspicious for demyelinating disease.  4.  Minimal cerebellar tonsillar ectopia, not meeting criteria for Chiari I malformation. No hydrocephalus.         No data to display                  REVIEW OF SYSTEMS:                                                      10-point review of systems is negative except as mentioned above in HPI.    EXAM:                                                      Physical Exam:   Vitals: /72   Pulse 53   Ht 1.422 m (4' 8\")   Wt 54 kg (119 lb)   BMI 26.68 kg/m    BMI= Body mass index is 26.68 kg/m .  GENERAL: NAD.  HEENT: NC/AT.  PULM: Non-labored breathing.   Neurologic:  MENTAL STATUS: Alert, attentive. Speech is fluent. Normal comprehension. Normal concentration. Adequate fund of knowledge.   CRANIAL NERVES: Visual fields intact to confrontation. Pupils equally, round and reactive to light. Facial sensation and movement normal. EOM full. Hearing intact " to conversation. Trapezius strength intact. Palate moves symmetrically. Tongue midline.  MOTOR: 5/5 in proximal and distal muscle groups of upper and lower extremities. Tone and bulk normal.   SENSATION: Normal light touch throughout.   COORDINATION: Normal finger nose finger. Finger tapping normal. Knee heel shin normal.  STATION AND GAIT: Romberg Negative. Good postural reflexes. Casual gait and tandem Normal  right hand-dominant.      ASSESSMENT and PLAN:                                                      Assessment:    ICD-10-CM    1. Abnormal MRI of head  R93.0 MR Brain w/o & w Contrast     CANCELED: MR Brain w/o & w Contrast      2. Migraine without status migrainosus, not intractable, unspecified migraine type  G43.909 Adult Neurology  Referral     nortriptyline (PAMELOR) 10 MG capsule      3. Dizziness  R42 Adult Neurology  Referral     Physical Therapy  Referral          Stephanie Goodman is a 44 year old female with a history of migraine, dizziness, vertigo and slow transit constipation who presents today to establish care for migraines.  Patient feels her symptoms significantly improved after therapy that was prescribed through her PCP.  She continues to have mild headaches daily with more severe headaches 2-3 times per week we discussed interventions outlined below I will plan to see the patient back in 3 months to follow-up on medication management in 6 months to establish with a neurologist to review updated imaging.  Stephanie understands and agrees with this plan.    Plan:  --- Vestibular therapy- vertigo  --- MRI ordered - 6 month monitor   --- Start preventive therapy: Nortriptyline 10 mg nightly  --- Continue Abortive therapy: Tylenol as needed  --- Try Nonpharmacological interventions like heat or cold, massage, or rest  --- Practice good headache hygiene: Avoid known triggers, get adequate sleep, manage stress levels, stay hydrated and eat nutritious meals  --- Plan on  follow up in the Neurology Clinic in 6 months to establish with a neurologist.  --- Please feel free to reach out if you have any further questions or concerns.  --- Seek immediate medical attention if an emergency arises or if your health becomes progressively worse.     It was a pleasure meeting you today!     Total Time: Total time spent for face to face visit, reviewing labs/imaging studies, counseling and coordination of care was: 55 minutes spent on the date of the encounter doing chart review, review of outside records, review of test results, patient visit, documentation, discussion with other provider(s), and interpreterCHJ    This note was dictated using voice recognition software.  Any grammatical or context distortions are unintentional and inherent to the software.    Stephy Rivera, DNP, APRN, CNP  [facility]

## 2025-05-22 ENCOUNTER — OFFICE VISIT (OUTPATIENT)
Dept: NEUROLOGY | Facility: CLINIC | Age: 45
End: 2025-05-22
Attending: PHYSICIAN ASSISTANT
Payer: COMMERCIAL

## 2025-05-22 VITALS
DIASTOLIC BLOOD PRESSURE: 72 MMHG | WEIGHT: 119 LBS | SYSTOLIC BLOOD PRESSURE: 108 MMHG | HEART RATE: 53 BPM | BODY MASS INDEX: 26.77 KG/M2 | HEIGHT: 56 IN

## 2025-05-22 DIAGNOSIS — G43.909 MIGRAINE WITHOUT STATUS MIGRAINOSUS, NOT INTRACTABLE, UNSPECIFIED MIGRAINE TYPE: ICD-10-CM

## 2025-05-22 DIAGNOSIS — R93.0 ABNORMAL MRI OF HEAD: Primary | ICD-10-CM

## 2025-05-22 DIAGNOSIS — R42 DIZZINESS: ICD-10-CM

## 2025-05-22 RX ORDER — NORTRIPTYLINE HYDROCHLORIDE 10 MG/1
10 CAPSULE ORAL AT BEDTIME
Qty: 30 CAPSULE | Refills: 3 | Status: SHIPPED | OUTPATIENT
Start: 2025-05-22

## 2025-05-22 RX ORDER — ACETAMINOPHEN 325 MG/1
325-650 TABLET ORAL EVERY 6 HOURS PRN
COMMUNITY

## 2025-05-22 NOTE — LETTER
5/22/2025      Stephanie Goodman  [address]  [place] [address]      Dear Colleague,    Thank you for referring your patient, Stephanie Goodman, to the [facility] [place]. Please see a copy of my visit note below.      __________________________________  ESTABLISHED PATIENT NEUROLOGY NOTE    DATE OF VISIT: 5/22/2025  MRN: [id number]  PATIENT NAME: Stephanie Goodman  YOB: 1980    Chief Complaint   Patient presents with     Headache     Patient states her headaches have improved . New patient  MRI completed in 1/25     SUBJECTIVE:                                                      HISTORY OF PRESENT ILLNESS:  Stephanie is here for follow up regarding headache    Stephanie Goodman is a 44 year old female with a history of migraine, dizziness, vertigo and slow transit constipation who presents today to establish care for migraines.  Per chart review patient continues Tylenol.  Sumatriptan at onset of headache.    05/22/25:  Stephanie presents to the clinic today to establish care for headache.  She presents with a .  Patient states that she has always had headaches.  Last January she had a severe headache with associated symptoms of vertigo and nausea.  She was seen in the emergency department.  After following up with her primary care provider therapies were ordered.  She completed 2 sessions and had improvement in her headache and dizziness.  She tells me that she still has mild dizziness lasting a few seconds when she first lays down at night.  This is accompanied by nausea that quickly resolves.    She tells me that her headaches are typically 7-8 out of 10 throbbing pain that starts at the back of her head and radiates up to the front.  She takes Tylenol 2-3 times per week with resolution of her symptoms.  Associated symptoms include phonophobia and blurred vision.  She denies photophobia, nausea or vomiting.  She has noticed that her  headaches are daily and mild in nature with the exception of 2-3 more severe headaches per week that are treated with Tylenol.    She works from 5 PM to 1 AM.  She gets home and goes to bed at 2 AM and wakes up at 630 to bring her children to school.  She will often come home and sleep for an extra hour.  She is interested in starting a preventative therapy for headache.  We discussed topiramate, propranolol and nortriptyline.  Not interested in Topamax or propranolol.  Pulses 53 in the clinic today.  We will start with a low-dose of nortriptyline.  She is no longer taking meclizine or sumatriptan as prescribed in the ED last January.  Since January Stephanie started working out on a daily basis and eating healthier meals.    We also discussed her MRI findings.  I reviewed imaging with Dr. Alcala in clinic.  Patient and I discussed how abnormal findings may be related to her headaches or vascular risk factors.  We reviewed vascular risk factors in the clinic today.  No hypertension, diabetes smoking or CONTRERAS.  LDL is slightly elevated.  We discussed healthy lifestyle modifications.  We will monitor her MRI in the next 6 months to ensure that it remains stable.     Current Medications:   Current Outpatient Medications   Medication Sig Dispense Refill     acetaminophen (TYLENOL) 325 MG tablet Take 325-650 mg by mouth every 6 hours as needed for mild pain.       nortriptyline (PAMELOR) 10 MG capsule Take 1 capsule (10 mg) by mouth at bedtime. 30 capsule 3     meclizine (ANTIVERT) 25 MG tablet Take 1 tablet (25 mg) by mouth nightly as needed for dizziness. (Patient not taking: Reported on 5/22/2025) 20 tablet 0     omeprazole (PRILOSEC) 20 MG DR capsule Take 1 capsule (20 mg) by mouth daily (Patient not taking: Reported on 5/22/2025) 90 capsule 1     polyethylene glycol (MIRALAX) 17 GM/Dose powder Take 17 g (1 Capful) by mouth daily. (Patient not taking: Reported on 5/22/2025) 578 g 2     SUMAtriptan (IMITREX) 50 MG tablet  "Take 1 tablet (50 mg) by mouth at onset of headache for migraine. May repeat in 2 hours. Max 4 tablets/24 hours. (Patient not taking: Reported on 5/22/2025) 20 tablet 0     No current facility-administered medications for this visit.     Past Medical History:   Patient  has no past medical history on file.  Surgical History:  She  has no past surgical history on file.  Family and Social History:  Reviewed, and she  reports that she has never smoked. She has never used smokeless tobacco. She reports current alcohol use. She reports that she does not use drugs.  Reviewed, and family history includes Diabetes in her mother.      RECENT DIAGNOSTIC STUDIES:     Imaging:     EXAM: MR BRAIN W/O and W CONTRAST  LOCATION: [facility]  DATE: 1/3/2025                                                               IMPRESSION:     1.  No acute intracranial abnormality. Specifically, no evidence of recent infarct.  2.  No intracranial mass lesion.  3.  Scattered small foci of T2/FLAIR hyperintense signal abnormality throughout the cerebral white matter. Findings are nonspecific with differential considerations including sequela of early chronic microvascular ischemic disease, sequela of migraine   headaches, or gliosis related to other prior trauma/inflammation. Appearance is not overtly suspicious for demyelinating disease.  4.  Minimal cerebellar tonsillar ectopia, not meeting criteria for Chiari I malformation. No hydrocephalus.         No data to display                  REVIEW OF SYSTEMS:                                                      10-point review of systems is negative except as mentioned above in HPI.    EXAM:                                                      Physical Exam:   Vitals: /72   Pulse 53   Ht 1.422 m (4' 8\")   Wt 54 kg (119 lb)   BMI 26.68 kg/m    BMI= Body mass index is 26.68 kg/m .  GENERAL: NAD.  HEENT: NC/AT.  PULM: Non-labored breathing.   Neurologic:  MENTAL " STATUS: Alert, attentive. Speech is fluent. Normal comprehension. Normal concentration. Adequate fund of knowledge.   CRANIAL NERVES: Visual fields intact to confrontation. Pupils equally, round and reactive to light. Facial sensation and movement normal. EOM full. Hearing intact to conversation. Trapezius strength intact. Palate moves symmetrically. Tongue midline.  MOTOR: 5/5 in proximal and distal muscle groups of upper and lower extremities. Tone and bulk normal.   SENSATION: Normal light touch throughout.   COORDINATION: Normal finger nose finger. Finger tapping normal. Knee heel shin normal.  STATION AND GAIT: Romberg Negative. Good postural reflexes. Casual gait and tandem Normal  right hand-dominant.      ASSESSMENT and PLAN:                                                      Assessment:    ICD-10-CM    1. Abnormal MRI of head  R93.0 MR Brain w/o & w Contrast     CANCELED: MR Brain w/o & w Contrast      2. Migraine without status migrainosus, not intractable, unspecified migraine type  G43.909 Adult Neurology  Referral     nortriptyline (PAMELOR) 10 MG capsule      3. Dizziness  R42 Adult Neurology  Referral     Physical Therapy  Referral          Stephanie LYNN Goodman is a 44 year old female with a history of migraine, dizziness, vertigo and slow transit constipation who presents today to establish care for migraines.  Patient feels her symptoms significantly improved after therapy that was prescribed through her PCP.  She continues to have mild headaches daily with more severe headaches 2-3 times per week we discussed interventions outlined below I will plan to see the patient back in 3 months to follow-up on medication management in 6 months to establish with a neurologist to review updated imaging.  Stephanie understands and agrees with this plan.    Plan:  --- Vestibular therapy- vertigo  --- MRI ordered - 6 month monitor   --- Start preventive therapy: Nortriptyline 10 mg  nightly  --- Continue Abortive therapy: Tylenol as needed  --- Try Nonpharmacological interventions like heat or cold, massage, or rest  --- Practice good headache hygiene: Avoid known triggers, get adequate sleep, manage stress levels, stay hydrated and eat nutritious meals  --- Plan on follow up in the Neurology Clinic in 6 months to establish with a neurologist.  --- Please feel free to reach out if you have any further questions or concerns.  --- Seek immediate medical attention if an emergency arises or if your health becomes progressively worse.     It was a pleasure meeting you today!     Total Time: Total time spent for face to face visit, reviewing labs/imaging studies, counseling and coordination of care was: 55 minutes spent on the date of the encounter doing chart review, review of outside records, review of test results, patient visit, documentation, discussion with other provider(s), and interpreterCHJ    This note was dictated using voice recognition software.  Any grammatical or context distortions are unintentional and inherent to the software.    Stephy Rivera, JASON, APRN, CNP  [facility]         Again, thank you for allowing me to participate in the care of your patient.        Sincerely,        SIGRID Martinez CNP    Electronically signed

## 2025-05-22 NOTE — PATIENT INSTRUCTIONS
Plan:  --- Vestibular therapy- vertigo  --- MRI ordered - 6 month monitor   --- Start preventive therapy: Nortriptyline 10 mg nightly  --- Continue Abortive therapy: Tylenol as needed  --- Try Nonpharmacological interventions like heat or cold, massage, or rest  --- Practice good headache hygiene: Avoid known triggers, get adequate sleep, manage stress levels, stay hydrated and eat nutritious meals  --- Plan on follow up in the Neurology Clinic in 6 months to establish with a neurologist.  --- Please feel free to reach out if you have any further questions or concerns.  --- Seek immediate medical attention if an emergency arises or if your health becomes progressively worse.     It was a pleasure meeting you today!

## 2025-05-22 NOTE — NURSING NOTE
Chief Complaint   Patient presents with    Headache     Patient states her headaches have improved . New patient  MRI completed in 1/25     Janeth De La Rosa on 5/22/2025 at 9:56 AM

## 2025-06-23 ENCOUNTER — THERAPY VISIT (OUTPATIENT)
Dept: PHYSICAL THERAPY | Facility: CLINIC | Age: 45
End: 2025-06-23
Payer: COMMERCIAL

## 2025-06-23 DIAGNOSIS — H81.11 BENIGN PAROXYSMAL POSITIONAL VERTIGO, RIGHT: Primary | ICD-10-CM

## 2025-06-23 PROCEDURE — 97112 NEUROMUSCULAR REEDUCATION: CPT | Mod: GP | Performed by: PHYSICAL THERAPIST

## 2025-06-23 NOTE — PROGRESS NOTES
PHYSICAL THERAPY EVALUATION  Type of Visit: Evaluation       Fall Risk Screen:  Have you fallen 2 or more times in the past year?: No  Have you fallen and had an injury in the past year?: No    Subjective         Presenting condition or subjective complaint: yes  Date of onset: 02/15/25    Relevant medical history: Dizziness   Dates & types of surgery: not    Prior diagnostic imaging/testing results: CT scan   Scattered small foci of T2/FLAIR hyperintense signal abnormality throughout the cerebral white matter. Findings are nonspecific with differential considerations including sequela of early chronic microvascular ischemic disease, sequela of migraine   headaches, or gliosis related to other prior trauma/inflammation. Appearance is not overtly suspicious for demyelinating disease---MRI results.  Prior therapy history for the same diagnosis, illness or injury: Yes      Living Environment  Social support: With family members   Type of home: House   Stairs to enter the home: No       Ramp: No   Stairs inside the home: Yes 5 Is there a railing: Yes     Help at home: None  Equipment owned:       Employment: Yes clinnign  Hobbies/Interests:      Patient goals for therapy: not to be dizzy when I turn over in bed    Pain assessment: Pain denied     Objective      Cognitive Status Examination  Orientation: Oriented to person, place and time   Level of Consciousness: Alert  Follows Commands and Answers Questions: 100% of the time  Personal Safety and Judgement: Intact  Memory: Intact    OBSERVATION: arrives by herself today  INTEGUMENTARY: Intact  POSTURE: WNL    BED MOBILITY: Independent    TRANSFERS: Independent    WHEELCHAIR MOBILITY: n/a    GAIT:   Level of [place]: Independent  Assistive Device(s): None  Gait Deviations: WFL    BALANCE: WFL    Not tested formally today but reports no issues with job cleaning and other daily tasks.    SENSATION: UE Sensation WNL, LE Sensation WNL       VESTIBULAR  EVALUATION  ADDITIONAL HISTORY:  Description of symptoms:  feels like the room is spinning, only last for a few seconds. Less time than last time. Hx of BPPV in the past (feb 2025). Also hx of headaches and migraines.  Dizzy attacks:   Start:  started back up about week after last treated in Feb.   Last attack:     Frequency of occurrences:     Length of attack:  5-10 secs  Difficulty hearing:     Noise in ears?    Sometimes she hears a sound/like she is hearing herself.  Alleviates symptoms:    Worsens symptoms:  lying on R side  Activities that bring on symptoms:         Pertinent visual history: reports on changes in her vision  Pertinent history of current vestibular problem: Migraines  DHI:      Cervicogenic Screen    Neck ROM Normal   Vertebral Artery Test Tolerated wilbur hallpike     Oculomotor Screen    Ocular ROM Normal   Smooth Pursuit Normal   Saccades Normal   VOR    VOR Cancellation    Head Impulse Test    Convergence Testing         Infrared Goggle Exam Vestibular Suppressant in Last 24 Hours? No  Exam Completed With: Infrared goggles   Spontaneous Nystagmus Negative   Gaze Evoked Nystagmus Negative   Head Shake Horizontal Nystagmus    Positional Testing    Supine Head-Hanging Test Negative    Left Right   Wilbur-Hallpike Negative Negative   Sidelying Test     HSCC Supine Roll Test Negative Negative   HSCC Forward Roll Test     [place] Test -  Sitting Erect    [place]an Test - Seated, Head Bent 60 Degrees Forward    [place] and [place]an Test - Seated, Head Bent Backwards       Negative for BPPV today.  Assessment & Plan   CLINICAL IMPRESSIONS  Medical Diagnosis: BPPV    Treatment Diagnosis: dizziness   Impression/Assessment: Patient is a 45 year old female with hx of dizziness. Did not provoke her dizziness today with positional testing and she was negative for BPPV. She did have BPPV in the past so either it resolved on its own or this round of dizziness was more related to migraine/headaches. She mostly  has been symptom free. Encouraged her to return to therapy if vertigo returns or more dizziness with movement for vestibular rehab.  The following significant findings have been identified: Dizziness. These impairments interfere with their ability to perform work tasks and community mobility as compared to previous level of function.     Clinical Decision Making (Complexity):  Clinical Presentation: Stable/Uncomplicated  Clinical Presentation Rationale: based on medical and personal factors listed in PT evaluation  Clinical Decision Making (Complexity): Low complexity    PLAN OF CARE  Treatment Interventions:  Interventions: Gait Training, Neuromuscular Re-education, Therapeutic Activity, Therapeutic Exercise    Long Term Goals     PT Goal 1  Goal Identifier: lying supine  Goal Description: Stephanie will report no dizziness with lying supine in 1 week period order to sleep better and return to prior level of function./  Target Date: 09/15/25      Frequency of Treatment: additional 2-3 visits as needed for dizziness  Duration of Treatment: 12 weeks    Recommended Referrals to Other Professionals:   Education Assessment:   Learner/Method: Patient;Family;Listening;Reading;Demonstration    Risks and benefits of evaluation/treatment have been explained.   Patient/Family/caregiver agrees with Plan of Care.     Evaluation Time:             Signing Clinician: Parris García, PT

## 2025-07-08 ENCOUNTER — OFFICE VISIT (OUTPATIENT)
Dept: FAMILY MEDICINE | Facility: CLINIC | Age: 45
End: 2025-07-08
Payer: COMMERCIAL

## 2025-07-08 VITALS
WEIGHT: 115.8 LBS | BODY MASS INDEX: 26.05 KG/M2 | TEMPERATURE: 97.1 F | SYSTOLIC BLOOD PRESSURE: 136 MMHG | DIASTOLIC BLOOD PRESSURE: 85 MMHG | HEART RATE: 56 BPM | HEIGHT: 56 IN | RESPIRATION RATE: 18 BRPM | OXYGEN SATURATION: 100 %

## 2025-07-08 DIAGNOSIS — Z13.220 LIPID SCREENING: ICD-10-CM

## 2025-07-08 DIAGNOSIS — N89.8 VAGINAL ITCHING: ICD-10-CM

## 2025-07-08 DIAGNOSIS — Z12.11 SCREEN FOR COLON CANCER: ICD-10-CM

## 2025-07-08 DIAGNOSIS — Z80.0 FAMILY HISTORY OF LIVER CANCER: ICD-10-CM

## 2025-07-08 DIAGNOSIS — Z98.51 S/P TUBAL LIGATION: ICD-10-CM

## 2025-07-08 DIAGNOSIS — N94.6 PAINFUL MENSTRUAL PERIODS: ICD-10-CM

## 2025-07-08 DIAGNOSIS — G43.909 MIGRAINE WITHOUT STATUS MIGRAINOSUS, NOT INTRACTABLE, UNSPECIFIED MIGRAINE TYPE: ICD-10-CM

## 2025-07-08 DIAGNOSIS — H81.11 BPV (BENIGN POSITIONAL VERTIGO), RIGHT: ICD-10-CM

## 2025-07-08 DIAGNOSIS — Z00.00 ROUTINE GENERAL MEDICAL EXAMINATION AT A HEALTH CARE FACILITY: Primary | ICD-10-CM

## 2025-07-08 DIAGNOSIS — Z13.1 SCREENING FOR DIABETES MELLITUS: ICD-10-CM

## 2025-07-08 DIAGNOSIS — Z86.2 H/O IRON DEFICIENCY ANEMIA: ICD-10-CM

## 2025-07-08 LAB
ALBUMIN SERPL BCG-MCNC: 4.1 G/DL (ref 3.5–5.2)
ALP SERPL-CCNC: 61 U/L (ref 40–150)
ALT SERPL W P-5'-P-CCNC: 12 U/L (ref 0–50)
AST SERPL W P-5'-P-CCNC: 23 U/L (ref 0–45)
BILIRUB SERPL-MCNC: 0.2 MG/DL
BILIRUBIN DIRECT (ROCHE PRO & PURE): 0.09 MG/DL (ref 0–0.45)
CHOLEST SERPL-MCNC: 214 MG/DL
CLUE CELLS: ABNORMAL
ERYTHROCYTE [DISTWIDTH] IN BLOOD BY AUTOMATED COUNT: 17.1 % (ref 10–15)
EST. AVERAGE GLUCOSE BLD GHB EST-MCNC: 103 MG/DL
FASTING STATUS PATIENT QL REPORTED: YES
FERRITIN SERPL-MCNC: 16 NG/ML (ref 6–175)
HBA1C MFR BLD: 5.2 % (ref 0–5.6)
HCT VFR BLD AUTO: 36.6 % (ref 35–47)
HDLC SERPL-MCNC: 63 MG/DL
HGB BLD-MCNC: 12.2 G/DL (ref 11.7–15.7)
IRON BINDING CAPACITY (ROCHE): 416 UG/DL (ref 240–430)
IRON SATN MFR SERPL: 6 % (ref 15–46)
IRON SERPL-MCNC: 25 UG/DL (ref 37–145)
LDLC SERPL CALC-MCNC: 131 MG/DL
MCH RBC QN AUTO: 27.1 PG (ref 26.5–33)
MCHC RBC AUTO-ENTMCNC: 33.3 G/DL (ref 31.5–36.5)
MCV RBC AUTO: 81 FL (ref 78–100)
NONHDLC SERPL-MCNC: 151 MG/DL
PLATELET # BLD AUTO: 296 10E3/UL (ref 150–450)
PROT SERPL-MCNC: 7.3 G/DL (ref 6.4–8.3)
RBC # BLD AUTO: 4.51 10E6/UL (ref 3.8–5.2)
TRICHOMONAS, WET PREP: ABNORMAL
TRIGL SERPL-MCNC: 100 MG/DL
WBC # BLD AUTO: 7.6 10E3/UL (ref 4–11)
WBC'S/HIGH POWER FIELD, WET PREP: ABNORMAL
YEAST, WET PREP: PRESENT

## 2025-07-08 PROCEDURE — 83036 HEMOGLOBIN GLYCOSYLATED A1C: CPT | Performed by: PHYSICIAN ASSISTANT

## 2025-07-08 PROCEDURE — 83550 IRON BINDING TEST: CPT | Performed by: PHYSICIAN ASSISTANT

## 2025-07-08 PROCEDURE — 82728 ASSAY OF FERRITIN: CPT | Performed by: PHYSICIAN ASSISTANT

## 2025-07-08 PROCEDURE — 36415 COLL VENOUS BLD VENIPUNCTURE: CPT | Performed by: PHYSICIAN ASSISTANT

## 2025-07-08 PROCEDURE — 83540 ASSAY OF IRON: CPT | Performed by: PHYSICIAN ASSISTANT

## 2025-07-08 PROCEDURE — 3075F SYST BP GE 130 - 139MM HG: CPT | Performed by: PHYSICIAN ASSISTANT

## 2025-07-08 PROCEDURE — 3079F DIAST BP 80-89 MM HG: CPT | Performed by: PHYSICIAN ASSISTANT

## 2025-07-08 PROCEDURE — 85027 COMPLETE CBC AUTOMATED: CPT | Performed by: PHYSICIAN ASSISTANT

## 2025-07-08 PROCEDURE — 87210 SMEAR WET MOUNT SALINE/INK: CPT | Performed by: PHYSICIAN ASSISTANT

## 2025-07-08 PROCEDURE — 99396 PREV VISIT EST AGE 40-64: CPT | Performed by: PHYSICIAN ASSISTANT

## 2025-07-08 PROCEDURE — 80061 LIPID PANEL: CPT | Performed by: PHYSICIAN ASSISTANT

## 2025-07-08 PROCEDURE — 80076 HEPATIC FUNCTION PANEL: CPT | Performed by: PHYSICIAN ASSISTANT

## 2025-07-08 RX ORDER — MECLIZINE HYDROCHLORIDE 25 MG/1
25 TABLET ORAL
Qty: 20 TABLET | Refills: 3 | Status: SHIPPED | OUTPATIENT
Start: 2025-07-08 | End: 2025-07-08

## 2025-07-08 RX ORDER — MECLIZINE HYDROCHLORIDE 25 MG/1
25 TABLET ORAL 3 TIMES DAILY PRN
Qty: 30 TABLET | Refills: 3 | Status: SHIPPED | OUTPATIENT
Start: 2025-07-08

## 2025-07-08 SDOH — HEALTH STABILITY: PHYSICAL HEALTH: ON AVERAGE, HOW MANY DAYS PER WEEK DO YOU ENGAGE IN MODERATE TO STRENUOUS EXERCISE (LIKE A BRISK WALK)?: 5 DAYS

## 2025-07-08 ASSESSMENT — SOCIAL DETERMINANTS OF HEALTH (SDOH): HOW OFTEN DO YOU GET TOGETHER WITH FRIENDS OR RELATIVES?: ONCE A WEEK

## 2025-07-08 NOTE — PATIENT INSTRUCTIONS
"Patient Education   Consejos de atención preventiva  Se trata de consejos generales que solemos ofrecer para ayudar a las personas a mantenerse saludables. Oquendo equipo de atención puede darle consejos específicos. Hable con ellos sobre jose rafael propias necesidades de atención preventiva.  Estilo de alejandro  Ejercítese, alva mínimo, 150 minutos a la semana (30 minutos al día, 5 días a la semana).  Realice actividades de fortalecimiento muscular 2 días a la semana, ya que contribuyen al control del peso y a la prevención de enfermedades.  No fume.  Use protector solar para prevenir el cáncer de piel.  Cada 2 a 5 años, realice pruebas de detección de radón en oquendo hogar. Se trata de un gas incoloro e inodoro que puede dañar los pulmones. Para obtener más información, visite [email or web address] y busque \"Radon in Homes\" (Radón en los hogares).  Mantenga las jane descargadas y bajo llave en un lugar seguro, alva troy caja carley o troy cámara blindada, o use un candado para jane y esconda las llaves. Guarde siempre las balas por separado. Para obtener más información, visite [employer] y busque \"Safe Gun Storage\" (Almacenamiento seguro luther).  Alimentación  Consuma 5 o más porciones de frutas y verduras al día.  Pruebe el pan de jayda, el arroz integral y la pasta integral (en lugar de pan esparza, arroz esparza y pasta).  Consuma suficiente calcio y vitamina D. Revise la etiqueta de los alimentos y procure alcanzar el 100 % de la ingesta diaria recomendada (IDR).  Exámenes periódicos  Hágase un examen dental y troy limpieza cada 6 meses.  Visite a oquendo equipo de atención médica todos los años para hablar sobre lo siguiente:  Todo cambio en oquendo ange.  Todo medicamento que oquendo equipo de atención le haya recetado.  Atención preventiva, planificación familiar y formas de prevenir enfermedades crónicas.  Inyecciones (vacunas)   Vacunas contra el virus del papiloma humano (VPH) (hasta los 26 años), si nunca se las rollins colocado.  Vacunas " contra la hepatitis B (hasta los 59 años), si nunca se las rollins colocado.  Vacuna contra la COVID-19: vacúnese cuando corresponda.  Vacuna contra la gripe: vacúnese contra la gripe todos los años.  Vacuna contra el tétanos: vacúnese contra el tétanos cada 10 años.  Vacunas contra el neumococo, la hepatitis A y el virus sincicial respiratorio (VSR): pregunte a oquendo equipo de atención si las necesita en función de oquendo riesgo.  Vacuna contra el herpes zóster (para personas de 50 años o más).  Pruebas médicas generales  Examen de detección de diabetes:  A partir de los 35 años, hágase exámenes de detección de diabetes, alva mínimo, cada 3 años.  Si tiene menos de 35 años, pregunte a oquendo equipo de atención si debe hacerlo.  Prueba de colesterol: a los 39 años, comience a hacerse troy prueba de colesterol cada 5 años o con mayor frecuencia si se lo aconsejan.  Exploración de densidad ósea (DEXA): a los 50 años, pregunte a oquendo equipo de atención si debe hacerse esta exploración para detectar osteoporosis (fragilidad en los huesos).  Hepatitis C: hágase la prueba, alva mínimo, troy vez en la alejandro.  Examen de detección de aneurismas aórticos abdominales: hable con oquendo médico sobre la posibilidad de hacerse sebastien examen de detección si cumple alguna de las siguientes condiciones:  fumó alguna vez;  y  es hombre según oquendo sexo biológico;  y  tiene entre 65 y 75 años.  Infecciones de transmisión sexual (ITS)  Antes de los 24 años, pregunte a oquendo equipo de atención si debe hacerse exámenes de detección de ITS.  Después de los 24 años, hágase exámenes de detección de ITS si está en riesgo. Está en riesgo de contraer alguna ITS (incluido el virus de la inmunodeficiencia adquirida [VIH]) en los siguientes casos:  Tiene relaciones sexuales con más de troy persona.  No usa preservativos siempre que tiene relaciones sexuales.  A usted o a oquendo jorge le diagnosticaron troy infección de transmisión sexual.  Si está en riesgo de contraer el VIH,  consulte sobre los medicamentos de la profilaxis de preexposición (Pre-Exposure Prophylaxis, PrEP) para prevenirlo.  Hágase la prueba del VIH, alva mínimo, troy vez en la alejandro, tanto si está en riesgo de contraer el virus alva si no.  Pruebas de detección de cáncer  Examen de detección de cáncer de rui uterino: si tiene rui uterino, comience a hacerse pruebas periódicas de detección de cáncer de rui uterino a los 21 años. La mayoría de las personas que se hacen exámenes periódicos de detección y obtienen resultados normales pueden dejar de hacerlo a partir de los 65 años. Hable sobre esto con oquendo proveedor.  Exploración de detección de cáncer de mama (mamografía): si alguna vez ha tenido mamas, comience a hacerse mamografías periódicas a partir de los 40 años. Se trata de troy exploración para detectar el cáncer de mama.  Examen de detección de cáncer de colon: es importante comenzar a hacerse los exámenes de detección de cáncer de colon a los 45 años.  Hágase troy colonoscopía cada 10 años (o con más frecuencia si está en riesgo) O pregunte a oquendo proveedor sobre pruebas de heces, alva la prueba inmunoquímica fecal (Fecal Immunochemical Test, FIT) cada año o la prueba Cologuard cada 3 años.  Para obtener más información sobre las opciones de las pruebas que tiene a disposición, visite: www.fvfiles.com/727452qt.  Si necesita ayuda para mattie troy decisión, visite: [email or web address].  Prueba de detección de cáncer de próstata: si tiene próstata y está entre los 55 y 69 años, pregunte a oquendo proveedor si le convendría hacerse troy prueba anual de detección de cáncer de próstata.  Examen de detección de cáncer de pulmón: si fuma o fumó y tiene entre 50 y 80 años, pregunte a oquendo equipo de atención si los exámenes continuos de detección de cáncer de pulmón son adecuados para usted.    Solo para uso con fines informativos. Adrienne documento no pretende sustituir ninguna recomendación médica. Derechos de autor   2023 [facility].   Todos los derechos reservados. Revisión clínica a [facility] de [facility] Transitions Program. [employer] [id number] - REV 04/24.

## 2025-07-08 NOTE — PROGRESS NOTES
"Preventive Care Visit  [facility] JR Dodge PA-C, Family Medicine  Jul 8, 2025  {Provider  Link to [facility] :[id number]}    Assessment & Plan   Problem List Items Addressed This Visit          Obstetrics/Gynecology    S/P tubal ligation     Other Visit Diagnoses         Routine general medical examination at a health care facility    -  Primary      Screen for colon cancer        Relevant Orders    Colonoscopy Screening  Referral      Migraine without status migrainosus, not intractable, unspecified migraine type          Vaginal itching        Relevant Orders    Wet prep - lab collect      BPV (benign positional vertigo), right        Relevant Medications    meclizine (ANTIVERT) 25 MG tablet    Other Relevant Orders    Physical Therapy  Referral      Painful menstrual periods        Relevant Orders    US Pelvic Complete with Transvaginal      Lipid screening        Relevant Orders    Lipid panel reflex to direct LDL Fasting      H/O iron deficiency anemia        Relevant Orders    CBC with platelets    Iron and iron binding capacity    Ferritin      Screening for diabetes mellitus        Relevant Orders    Hemoglobin A1c      Family history of liver cancer        Relevant Orders    Hepatic panel (Albumin, ALT, AST, Bili, Alk Phos, TP)             Patient has been advised of split billing requirements and indicates understanding: Yes       BMI  Estimated body mass index is 25.96 kg/m  as calculated from the following:    Height as of this encounter: 1.422 m (4' 8\").    Weight as of this encounter: 52.5 kg (115 lb 12.8 oz).   Weight management plan: Discussed healthy diet and exercise guidelines  Reviewed preventive health counseling, as reflected in patient instructions       Regular exercise       Healthy diet/nutrition       Colorectal Cancer Screening  Counseling  Appropriate preventive services were addressed with this patient via screening, questionnaire, " or discussion as appropriate for fall prevention, nutrition, physical activity, Tobacco-use cessation, social engagement, weight loss and cognition.  Checklist reviewing preventive services available has been given to the patient.  Reviewed patient's diet, addressing concerns and/or questions.       Follow-up    Follow-up Visit   Expected date:  Jul 08, 2026 (Approximate)      Follow Up Appointment Details:     Follow-up with whom?: PCP    Follow-Up for what?: Adult Preventive    How?: In Person                 Lisa Brown is a 45 year old, presenting for the following:  Physical        7/8/2025     9:10 AM   Additional Questions   Roomed by Francy MAI       Vaginal Symptoms  Onset/Duration: had before period, resolved now   Description:  Vaginal Discharge: none   Itching (Pruritis): YES  Burning sensation:  No  Odor: No  Accompanying Signs & Symptoms:  Urinary symptoms: No  Abdominal pain: No  Fever: No  History:   Sexually active: YES  New Partner: No  Possibility of Pregnancy:  No, had tubal ligation  Recent antibiotic use: No  Previous vaginitis issues: YES- frequent bacterial vaginosis and yeast     Precipitating or alleviating factors: patient reports that its period  Therapies tried and outcome: Diflucan and Flagyl (metronidazole)-in the past  Patient also gets painful periods every other month. When periods are painful the menstrual flow is heavy.       ED/UC Followup:    Facility:  [facility]  Date of visit: 7/5/25  Reason for visit: vertigo and vomiting  Current Status: resolved   Patient has appointment with neurology  Sh also had appointment with PT, but canceled it because symptoms resolved for a period of time  Balance PT was helping well.     Advance Care Planning  {The storyboard will display whether the patient has ACP docs on file. Hover over the Code section in the storyboard to access the ACP documents. :[id number]}  Discussed advance care planning with patient; however, patient declined at  this time.        7/8/2025   General Health   How would you rate your overall physical health? Good   Feel stress (tense, anxious, or unable to sleep) Not at all         7/8/2025   Nutrition   Three or more servings of calcium each day? Yes   Diet: Regular (no restrictions)   How many servings of fruit and vegetables per day? (!) 2-3   How many sweetened beverages each day? 0-1         7/8/2025   Exercise   Days per week of moderate/strenous exercise 5 days         7/8/2025   Social Factors   Frequency of gathering with friends or relatives Once a week   Worry food won't last until get money to buy more Yes   Food not last or not have enough money for food? No   Do you have housing? (Housing is defined as stable permanent housing and does not include staying outside in a car, in a tent, in an abandoned building, in an overnight shelter, or couch-surfing.) Yes   Are you worried about losing your housing? No   Lack of transportation? No   Unable to get utilities (heat,electricity)? No   (!) FOOD SECURITY CONCERN PRESENT      7/8/2025   Dental   Dentist two times every year? Yes     Today's PHQ-2 Score:       7/8/2025     9:13 AM   PHQ-2 ( 1999 [employer])   Q1: Little interest or pleasure in doing things 0   Q2: Feeling down, depressed or hopeless 0   PHQ-2 Score 0         7/8/2025   Substance Use   Alcohol more than 3/day or more than 7/wk No   Do you use any other substances recreationally? No     Social History     Tobacco Use    Smoking status: Never    Smokeless tobacco: Never   Vaping Use    Vaping status: Never Used   Substance Use Topics    Alcohol use: Yes     Comment: occ    Drug use: No     {Provider  If there are gaps in the social history shown above, please follow the link to update and then refresh the note Link to Social and Substance History :401267}      8/9/2024   LAST FHS-7 RESULTS   1st degree relative breast or ovarian cancer No   Any relative bilateral breast cancer No   Any male have breast cancer  No   Any ONE woman have BOTH breast AND ovarian cancer No   Any woman with breast cancer before 50yrs No   2 or more relatives with breast AND/OR ovarian cancer No   2 or more relatives with breast AND/OR bowel cancer No     {If any of the questions to the FHS7 are answered yes, consider referral for genetic counseling.    Additional indications for genetic referral include personal history of breast or ovarian cancer, genetic mutation in 1st degree relative which increases risk of breast cancer including BRCA1, BRCA2, HILARIO, PALB 2, TP53, CHEK2, PTEN, CDH1, STK11 (per ACS) and/or 1st degree relative with history of pancreatic or high-risk prostate cancer (per NCCN):408738}   Mammogram Screening - Mammogram every 1-2 years updated in Health Maintenance based on mutual decision making        7/8/2025   STI Screening   New sexual partner(s) since last STI/HIV test? No     History of abnormal Pap smear: No - age 30-64 HPV with reflex Pap every 5 years recommended        Latest Ref Rng & Units 4/15/2024     8:31 AM 12/3/2018     9:22 AM 12/3/2018     8:40 AM   PAP / HPV   PAP  Negative for Intraepithelial Lesion or Malignancy (NILM)      PAP (Historical)   NIL     HPV 16 DNA Negative Negative   Negative    HPV 18 DNA Negative Negative   Negative    Other HR HPV Negative Negative   Negative      ASCVD Risk   The 10-year ASCVD risk score (Daniel SEBASTIAN, et al., 2019) is: 1.2%    Values used to calculate the score:      Age: 45 years      Sex: Female      Is Non- : No      Diabetic: No      Tobacco smoker: No      Systolic Blood Pressure: 136 mmHg      Is BP treated: No      HDL Cholesterol: 44 mg/dL      Total Cholesterol: 190 mg/dL        7/8/2025   Contraception/Family Planning   Questions about contraception or family planning No   What are your periods like? Regular      {Provider  REQUIRED FOR AWV Use the storyboard to review patient history, after sections have been marked as reviewed,  "refresh note to capture documentation:[id number]}   Reviewed and updated as needed this visit by Provider   Tobacco  Allergies  Meds  Problems  Med Hx  Surg Hx  Fam Hx            Patient Active Problem List   Diagnosis    Lumbar discitis    Chronic bilateral low back pain without sciatica    Tendonitis of wrist, left    S/P tubal ligation     History reviewed. No pertinent surgical history.    Social History     Tobacco Use    Smoking status: Never    Smokeless tobacco: Never   Substance Use Topics    Alcohol use: Yes     Comment: occ     Family History   Problem Relation Age of Onset    Diabetes Mother          Current Outpatient Medications   Medication Sig Dispense Refill    meclizine (ANTIVERT) 25 MG tablet Take 1 tablet (25 mg) by mouth 3 times daily as needed for dizziness. 30 tablet 3    nortriptyline (PAMELOR) 10 MG capsule Take 1 capsule (10 mg) by mouth at bedtime. 30 capsule 3    acetaminophen (TYLENOL) 325 MG tablet Take 325-650 mg by mouth every 6 hours as needed for mild pain. (Patient not taking: Reported on 7/8/2025)      omeprazole (PRILOSEC) 20 MG DR capsule Take 1 capsule (20 mg) by mouth daily (Patient not taking: Reported on 7/8/2025) 90 capsule 1    polyethylene glycol (MIRALAX) 17 GM/Dose powder Take 17 g (1 Capful) by mouth daily. (Patient not taking: Reported on 7/8/2025) 578 g 2    SUMAtriptan (IMITREX) 50 MG tablet Take 1 tablet (50 mg) by mouth at onset of headache for migraine. May repeat in 2 hours. Max 4 tablets/24 hours. (Patient not taking: Reported on 7/8/2025) 20 tablet 0     Allergies   Allergen Reactions    No Clinical Screening - See Comments          Review of Systems  Constitutional, HEENT, cardiovascular, pulmonary, gi and gu systems are negative, except as otherwise noted.     Objective    Exam  /85   Pulse 56   Temp 97.1  F (36.2  C)   Resp 18   Ht 1.422 m (4' 8\")   Wt 52.5 kg (115 lb 12.8 oz)   LMP 06/26/2025 (Exact Date)   SpO2 100%   BMI 25.96 kg/m   " "  Estimated body mass index is 25.96 kg/m  as calculated from the following:    Height as of this encounter: 1.422 m (4' 8\").    Weight as of this encounter: 52.5 kg (115 lb 12.8 oz).      Physical Exam  GENERAL: alert and no distress  EYES: Eyes grossly normal to inspection, PERRL and conjunctivae and sclerae normal  HENT: ear canals and TM's normal, nose and mouth without ulcers or lesions  NECK: no adenopathy, no asymmetry, masses, or scars  RESP: lungs clear to auscultation - no rales, rhonchi or wheezes  BREAST: normal without masses, tenderness or nipple discharge and no palpable axillary masses or adenopathy  CV: regular rate and rhythm, normal S1 S2, no S3 or S4, no murmur, click or rub, no peripheral edema  ABDOMEN: soft, nontender, no hepatosplenomegaly, no masses and bowel sounds normal  MS: no gross musculoskeletal defects noted, no edema  SKIN: no suspicious lesions or rashes  NEURO: Normal strength and tone, mentation intact and speech normal  PSYCH: mentation appears normal, affect normal/bright        Signed Electronically by: Steph Dodge PA-C  {Email feedback regarding this note to [email or web address]   :[id number]}  "

## 2025-07-16 ENCOUNTER — THERAPY VISIT (OUTPATIENT)
Dept: PHYSICAL THERAPY | Facility: CLINIC | Age: 45
End: 2025-07-16
Attending: PHYSICIAN ASSISTANT
Payer: COMMERCIAL

## 2025-07-16 DIAGNOSIS — H81.11 BPV (BENIGN POSITIONAL VERTIGO), RIGHT: ICD-10-CM

## 2025-07-16 PROCEDURE — 95992 CANALITH REPOSITIONING PROC: CPT | Mod: GP | Performed by: PHYSICAL THERAPIST

## 2025-07-16 PROCEDURE — 97112 NEUROMUSCULAR REEDUCATION: CPT | Mod: GP,59 | Performed by: PHYSICAL THERAPIST

## 2025-08-01 ENCOUNTER — ANCILLARY PROCEDURE (OUTPATIENT)
Dept: ULTRASOUND IMAGING | Facility: CLINIC | Age: 45
End: 2025-08-01
Attending: PHYSICIAN ASSISTANT
Payer: COMMERCIAL

## 2025-08-01 DIAGNOSIS — N94.6 PAINFUL MENSTRUAL PERIODS: ICD-10-CM

## 2025-08-01 PROCEDURE — 76830 TRANSVAGINAL US NON-OB: CPT | Mod: TC | Performed by: RADIOLOGY

## 2025-08-01 PROCEDURE — 76856 US EXAM PELVIC COMPLETE: CPT | Mod: TC | Performed by: RADIOLOGY

## 2025-08-11 ENCOUNTER — OFFICE VISIT (OUTPATIENT)
Dept: NEUROLOGY | Facility: CLINIC | Age: 45
End: 2025-08-11
Payer: COMMERCIAL

## 2025-08-11 VITALS
DIASTOLIC BLOOD PRESSURE: 83 MMHG | WEIGHT: 118.17 LBS | SYSTOLIC BLOOD PRESSURE: 143 MMHG | BODY MASS INDEX: 26.49 KG/M2 | HEART RATE: 52 BPM

## 2025-08-11 DIAGNOSIS — G43.909 MIGRAINE WITHOUT STATUS MIGRAINOSUS, NOT INTRACTABLE, UNSPECIFIED MIGRAINE TYPE: Primary | ICD-10-CM

## 2025-08-11 PROCEDURE — 3077F SYST BP >= 140 MM HG: CPT

## 2025-08-11 PROCEDURE — 99213 OFFICE O/P EST LOW 20 MIN: CPT

## 2025-08-11 PROCEDURE — 3079F DIAST BP 80-89 MM HG: CPT

## 2025-08-21 ENCOUNTER — TELEPHONE (OUTPATIENT)
Dept: GASTROENTEROLOGY | Facility: CLINIC | Age: 45
End: 2025-08-21
Payer: COMMERCIAL

## 2025-08-21 DIAGNOSIS — Z12.11 ENCOUNTER FOR SCREENING COLONOSCOPY: Primary | ICD-10-CM

## 2025-08-21 RX ORDER — BISACODYL 5 MG/1
TABLET, DELAYED RELEASE ORAL
Qty: 4 TABLET | Refills: 0 | Status: SHIPPED | OUTPATIENT
Start: 2025-08-21

## 2025-09-03 ENCOUNTER — TELEPHONE (OUTPATIENT)
Dept: GASTROENTEROLOGY | Facility: CLINIC | Age: 45
End: 2025-09-03
Payer: COMMERCIAL